# Patient Record
Sex: FEMALE | Race: WHITE | NOT HISPANIC OR LATINO | Employment: FULL TIME | URBAN - METROPOLITAN AREA
[De-identification: names, ages, dates, MRNs, and addresses within clinical notes are randomized per-mention and may not be internally consistent; named-entity substitution may affect disease eponyms.]

---

## 2017-02-09 ENCOUNTER — OFFICE VISIT (OUTPATIENT)
Dept: RETAIL CLINIC | Facility: CLINIC | Age: 42
End: 2017-02-09

## 2017-02-09 VITALS
HEIGHT: 66 IN | OXYGEN SATURATION: 98 % | TEMPERATURE: 99.4 F | BODY MASS INDEX: 22.5 KG/M2 | WEIGHT: 140 LBS | HEART RATE: 85 BPM | DIASTOLIC BLOOD PRESSURE: 80 MMHG | SYSTOLIC BLOOD PRESSURE: 116 MMHG

## 2017-02-09 DIAGNOSIS — R68.89 FLU-LIKE SYMPTOMS: Primary | ICD-10-CM

## 2017-02-09 LAB
EXPIRATION DATE: NORMAL
FLUAV AG NPH QL: NORMAL
FLUBV AG NPH QL: NORMAL
INTERNAL CONTROL: NORMAL
Lab: NORMAL

## 2017-02-09 PROCEDURE — 99213 OFFICE O/P EST LOW 20 MIN: CPT | Performed by: NURSE PRACTITIONER

## 2017-02-09 PROCEDURE — 87804 INFLUENZA ASSAY W/OPTIC: CPT | Performed by: NURSE PRACTITIONER

## 2017-02-09 RX ORDER — OSELTAMIVIR PHOSPHATE 75 MG/1
75 CAPSULE ORAL 2 TIMES DAILY
Qty: 10 CAPSULE | Refills: 0 | Status: SHIPPED | OUTPATIENT
Start: 2017-02-09 | End: 2017-02-20

## 2017-02-09 NOTE — PROGRESS NOTES
Subjective   Jeane Escalante is a 41 y.o. female.     History of Present Illness   Pt. Has been in the bed 2 days because she feels so bad. She has body aches and chills with sweats, headache, and cough. She hs used IBU for headache pain. She is drinking  A lot of fluids and has had mild nausea. She has had no known exposure to illness or flu and she did have the flu shot.  The following portions of the patient's history were reviewed and updated as appropriate: allergies, current medications, past family history, past social history, past surgical history and problem list.    Review of Systems   Constitutional: Positive for activity change, appetite change, chills, fatigue and fever.   HENT: Positive for congestion and rhinorrhea. Negative for ear pain.    Eyes: Negative.    Respiratory: Positive for cough. Negative for shortness of breath and wheezing.    Cardiovascular: Negative.    Endocrine: Negative.    Musculoskeletal: Positive for myalgias.   Skin: Negative.    Allergic/Immunologic: Negative.    Neurological: Positive for headaches.       Objective   Physical Exam   Constitutional: She is oriented to person, place, and time. She appears well-developed and well-nourished.   HENT:   Head: Normocephalic and atraumatic.   Nose: Nose normal.   Mouth/Throat: Oropharynx is clear and moist.   Cardiovascular: Normal rate, regular rhythm and normal heart sounds.  Exam reveals no friction rub.    No murmur heard.  Pulmonary/Chest: Effort normal and breath sounds normal. No respiratory distress. She has no wheezes. She has no rales. She exhibits no tenderness.   Lymphadenopathy:     She has no cervical adenopathy.   Neurological: She is alert and oriented to person, place, and time.   Skin: Skin is warm and dry.   Psychiatric: She has a normal mood and affect. Her behavior is normal. Judgment and thought content normal.   Nursing note and vitals reviewed.      Assessment/Plan   Jeane was seen today for cough, headache,  chills and hot flashes.    Diagnoses and all orders for this visit:    Flu-like symptoms  -     POC Influenza A / B  -     oseltamivir (TAMIFLU) 75 MG capsule; Take 1 capsule by mouth 2 (Two) Times a Day.        CHARLES Wilcox

## 2017-02-09 NOTE — PATIENT INSTRUCTIONS
Influenza, Adult  Influenza (flu) is an infection in the mouth, nose, and throat (respiratory tract) caused by a virus. The flu can make you feel very ill. Influenza spreads easily from person to person (contagious).   HOME CARE   · Only take medicines as told by your doctor.  · Use a cool mist humidifier to make breathing easier.  · Get plenty of rest until your fever goes away. This usually takes 3 to 4 days.  · Drink enough fluids to keep your pee (urine) clear or pale yellow.  · Cover your mouth and nose when you cough or sneeze.  · Wash your hands well to avoid spreading the flu.  · Stay home from work or school until your fever has been gone for at least 1 full day.  · Get a flu shot every year.  GET HELP RIGHT AWAY IF:   · You have trouble breathing or feel short of breath.  · Your skin or nails turn blue.  · You have severe neck pain or stiffness.  · You have a severe headache, facial pain, or earache.  · Your fever gets worse or keeps coming back.  · You feel sick to your stomach (nauseous), throw up (vomit), or have watery poop (diarrhea).  · You have chest pain.  · You have a deep cough that gets worse, or you cough up more thick spit (mucus).  MAKE SURE YOU:   · Understand these instructions.  · Will watch your condition.  · Will get help right away if you are not doing well or get worse.     This information is not intended to replace advice given to you by your health care provider. Make sure you discuss any questions you have with your health care provider.     Document Released: 09/26/2009 Document Revised: 01/08/2016 Document Reviewed: 03/18/2013  [employer] Interactive Patient Education ©2016 [employer]

## 2017-02-20 ENCOUNTER — OFFICE VISIT (OUTPATIENT)
Dept: RETAIL CLINIC | Facility: CLINIC | Age: 42
End: 2017-02-20

## 2017-02-20 VITALS
WEIGHT: 140 LBS | HEIGHT: 66 IN | HEART RATE: 78 BPM | OXYGEN SATURATION: 97 % | TEMPERATURE: 98.1 F | BODY MASS INDEX: 22.5 KG/M2

## 2017-02-20 DIAGNOSIS — J40 BRONCHITIS: Primary | ICD-10-CM

## 2017-02-20 DIAGNOSIS — Z72.0 TOBACCO ABUSE: ICD-10-CM

## 2017-02-20 PROCEDURE — 99213 OFFICE O/P EST LOW 20 MIN: CPT | Performed by: NURSE PRACTITIONER

## 2017-02-20 RX ORDER — PREDNISONE 10 MG/1
TABLET ORAL DAILY
Qty: 21 EACH | Refills: 0 | Status: SHIPPED | OUTPATIENT
Start: 2017-02-20 | End: 2017-02-26

## 2017-02-20 RX ORDER — CEFDINIR 300 MG/1
300 CAPSULE ORAL 2 TIMES DAILY
Qty: 20 CAPSULE | Refills: 0 | Status: SHIPPED | OUTPATIENT
Start: 2017-02-20 | End: 2017-03-02

## 2017-02-20 RX ORDER — DEXTROMETHORPHAN HYDROBROMIDE AND PROMETHAZINE HYDROCHLORIDE 15; 6.25 MG/5ML; MG/5ML
5 SYRUP ORAL 2 TIMES DAILY
Qty: 70 ML | Refills: 0 | Status: SHIPPED | OUTPATIENT
Start: 2017-02-20 | End: 2017-02-27

## 2017-05-30 ENCOUNTER — OFFICE VISIT (OUTPATIENT)
Dept: RETAIL CLINIC | Facility: CLINIC | Age: 42
End: 2017-05-30

## 2017-05-30 DIAGNOSIS — Z02.1 PRE-EMPLOYMENT DRUG SCREENING: Primary | ICD-10-CM

## 2018-05-01 ENCOUNTER — OFFICE VISIT (OUTPATIENT)
Dept: RETAIL CLINIC | Facility: CLINIC | Age: 43
End: 2018-05-01

## 2018-05-01 VITALS — OXYGEN SATURATION: 96 % | HEART RATE: 72 BPM | TEMPERATURE: 98.8 F

## 2018-05-01 DIAGNOSIS — J06.9 UPPER RESPIRATORY TRACT INFECTION, UNSPECIFIED TYPE: Primary | ICD-10-CM

## 2018-05-01 DIAGNOSIS — J20.9 ACUTE BRONCHITIS, UNSPECIFIED ORGANISM: ICD-10-CM

## 2018-05-01 PROCEDURE — 99213 OFFICE O/P EST LOW 20 MIN: CPT | Performed by: NURSE PRACTITIONER

## 2018-05-01 RX ORDER — DEXTROMETHORPHAN HYDROBROMIDE AND PROMETHAZINE HYDROCHLORIDE 15; 6.25 MG/5ML; MG/5ML
5 SYRUP ORAL 4 TIMES DAILY PRN
Qty: 1 ML | Refills: 0 | Status: SHIPPED | OUTPATIENT
Start: 2018-05-01 | End: 2018-07-09

## 2018-05-01 RX ORDER — AZITHROMYCIN 250 MG/1
TABLET, FILM COATED ORAL
Qty: 6 TABLET | Refills: 0 | Status: SHIPPED | OUTPATIENT
Start: 2018-05-01 | End: 2018-05-02

## 2018-05-01 RX ORDER — PREDNISONE 10 MG/1
TABLET ORAL DAILY
Qty: 21 EACH | Refills: 0 | Status: SHIPPED | OUTPATIENT
Start: 2018-05-01 | End: 2018-05-07

## 2018-05-01 RX ORDER — ALBUTEROL SULFATE 90 UG/1
2 AEROSOL, METERED RESPIRATORY (INHALATION) EVERY 4 HOURS PRN
Qty: 1 INHALER | Refills: 0 | Status: SHIPPED | OUTPATIENT
Start: 2018-05-01 | End: 2018-07-09

## 2018-05-01 RX ORDER — FLUTICASONE PROPIONATE 50 MCG
2 SPRAY, SUSPENSION (ML) NASAL DAILY
Qty: 1 BOTTLE | Refills: 0 | Status: SHIPPED | OUTPATIENT
Start: 2018-05-01 | End: 2018-07-09

## 2018-05-01 NOTE — PATIENT INSTRUCTIONS
Acute Bronchitis, Adult  Acute bronchitis is when air tubes (bronchi) in the lungs suddenly get swollen. The condition can make it hard to breathe. It can also cause these symptoms:  · A cough.  · Coughing up clear, yellow, or green mucus.  · Wheezing.  · Chest congestion.  · Shortness of breath.  · A fever.  · Body aches.  · Chills.  · A sore throat.  Follow these instructions at home:  Medicines   · Take over-the-counter and prescription medicines only as told by your doctor.  · If you were prescribed an antibiotic medicine, take it as told by your doctor. Do not stop taking the antibiotic even if you start to feel better.  General instructions   · Rest.  · Drink enough fluids to keep your pee (urine) clear or pale yellow.  · Avoid smoking and secondhand smoke. If you smoke and you need help quitting, ask your doctor. Quitting will help your lungs heal faster.  · Use an inhaler, cool mist vaporizer, or humidifier as told by your doctor.  · Keep all follow-up visits as told by your doctor. This is important.  How is this prevented?  To lower your risk of getting this condition again:  · Wash your hands often with soap and water. If you cannot use soap and water, use hand .  · Avoid contact with people who have cold symptoms.  · Try not to touch your hands to your mouth, nose, or eyes.  · Make sure to get the flu shot every year.  Contact a doctor if:  · Your symptoms do not get better in 2 weeks.  Get help right away if:  · You cough up blood.  · You have chest pain.  · You have very bad shortness of breath.  · You become dehydrated.  · You faint (pass out) or keep feeling like you are going to pass out.  · You keep throwing up (vomiting).  · You have a very bad headache.  · Your fever or chills gets worse.  This information is not intended to replace advice given to you by your health care provider. Make sure you discuss any questions you have with your health care provider.  Document Released: 06/05/2009  Document Revised: 07/26/2017 Document Reviewed: 06/07/2017  [employer] Interactive Patient Education © 2017 [employer]

## 2018-05-01 NOTE — PROGRESS NOTES
Martha Escalante is a 43 y.o. female.     History of Present Illness   Patient presents with cough, nasal congestion, headache and chills that began 4 days ago. She is getting worse rather than better and has been tired and lethargic. She is taking Mucinex for cough and IBU for headache pain. She has a tactile fever and chills.  The following portions of the patient's history were reviewed and updated as appropriate: allergies, current medications, past family history, past social history, past surgical history and problem list.    Review of Systems   Constitutional: Positive for activity change, chills, fatigue and fever.   HENT: Positive for congestion, nosebleeds, postnasal drip and rhinorrhea. Negative for sore throat, trouble swallowing and voice change.    Eyes: Negative.    Respiratory: Positive for cough. Negative for shortness of breath and wheezing.    Cardiovascular: Negative.    Musculoskeletal: Negative.    Skin: Negative.    Allergic/Immunologic: Negative.    Neurological: Negative.    Hematological: Negative.        Objective   Physical Exam   Constitutional: She is oriented to person, place, and time. Vital signs are normal. She appears well-developed and well-nourished. She is cooperative.   HENT:   Head: Normocephalic and atraumatic.   Right Ear: Hearing, tympanic membrane, external ear and ear canal normal.   Left Ear: Hearing, tympanic membrane, external ear and ear canal normal.   Nose: Mucosal edema and rhinorrhea present. Right sinus exhibits no maxillary sinus tenderness and no frontal sinus tenderness. Left sinus exhibits no maxillary sinus tenderness and no frontal sinus tenderness.   Mouth/Throat: Mucous membranes are normal. No oropharyngeal exudate, posterior oropharyngeal edema, posterior oropharyngeal erythema or tonsillar abscesses. Tonsils are 0 on the right. Tonsils are 0 on the left. No tonsillar exudate.   Eyes: Conjunctivae and EOM are normal. Pupils are equal, round, and  reactive to light.   Neck: Normal range of motion. Neck supple.   Cardiovascular: Normal rate, regular rhythm, normal heart sounds and intact distal pulses.  Exam reveals no friction rub.    No murmur heard.  Pulmonary/Chest: Effort normal and breath sounds normal. No respiratory distress. She has no wheezes. She has no rales.   Lymphadenopathy:     She has no cervical adenopathy.   Neurological: She is alert and oriented to person, place, and time.   Skin: Skin is warm. No rash noted. No erythema.   Psychiatric: She has a normal mood and affect. Her behavior is normal. Judgment and thought content normal.   Nursing note and vitals reviewed.      Assessment/Plan   Jeane was seen today for nasal congestion, cough, headache and chills.    Diagnoses and all orders for this visit:    Upper respiratory tract infection, unspecified type  -     azithromycin (ZITHROMAX Z-OMI) 250 MG tablet; Take 2 tablets the first day, then 1 tablet daily for 4 days.  -     albuterol (PROVENTIL HFA;VENTOLIN HFA) 108 (90 Base) MCG/ACT inhaler; Inhale 2 puffs Every 4 (Four) Hours As Needed for Wheezing or Shortness of Air.    Other orders  -     PredniSONE (DELTASONE) 10 MG (21) tablet pack; Take  by mouth Daily for 6 days.  -     promethazine-dextromethorphan (PROMETHAZINE-DM) 6.25-15 MG/5ML syrup; Take 5 mL by mouth 4 (Four) Times a Day As Needed for Cough.  -     fluticasone (FLONASE) 50 MCG/ACT nasal spray; 2 sprays into each nostril Daily.

## 2018-05-02 RX ORDER — AMOXICILLIN AND CLAVULANATE POTASSIUM 500; 125 MG/1; MG/1
1 TABLET, FILM COATED ORAL 2 TIMES DAILY
Qty: 20 TABLET | Refills: 0 | Status: SHIPPED | OUTPATIENT
Start: 2018-05-02 | End: 2018-05-12

## 2018-05-02 NOTE — PROGRESS NOTES
Patient arrived to clinic, states she has felt more fatigued and dizzy since starting medications last night.  Has not started taking steroids, yet.  Took azithromycin and promethazine DM.  Advised to stop taking zpak and promethazine DM.  Work excuse given for today.  Antibiotic order changed to augmentin 500/125.  Patient advised to seek higher level of care if symptoms continue to worsen.

## 2018-07-09 ENCOUNTER — OFFICE VISIT (OUTPATIENT)
Dept: RETAIL CLINIC | Facility: CLINIC | Age: 43
End: 2018-07-09

## 2018-07-09 VITALS — TEMPERATURE: 98.2 F | HEIGHT: 66 IN | HEART RATE: 77 BPM | BODY MASS INDEX: 23.5 KG/M2 | WEIGHT: 146.2 LBS

## 2018-07-09 DIAGNOSIS — J06.9 UPPER RESPIRATORY TRACT INFECTION, UNSPECIFIED TYPE: ICD-10-CM

## 2018-07-09 DIAGNOSIS — K02.9 DENTAL CARIES: Primary | ICD-10-CM

## 2018-07-09 PROCEDURE — 99213 OFFICE O/P EST LOW 20 MIN: CPT | Performed by: NURSE PRACTITIONER

## 2018-07-09 RX ORDER — FLUTICASONE PROPIONATE 50 MCG
2 SPRAY, SUSPENSION (ML) NASAL DAILY
Qty: 1 BOTTLE | Refills: 0 | OUTPATIENT
Start: 2018-07-09 | End: 2019-02-26

## 2018-07-09 RX ORDER — AMOXICILLIN 875 MG/1
875 TABLET, COATED ORAL 2 TIMES DAILY
Qty: 20 TABLET | Refills: 0 | OUTPATIENT
Start: 2018-07-09 | End: 2019-02-26

## 2018-07-09 NOTE — PROGRESS NOTES
Subjective   Jeane Escalante is a 43 y.o. female.     History of Present Illness   Patient presents with nasal congestion, frontal  headache pain and chills. She began with a sore throat but it is resolved now. Currently her nasal discharge is yellow green and she has pressure around her eyes. She has some tooth decay in the back molar areas and she wonders if it has something to do with her teeth.   The following portions of the patient's history were reviewed and updated as appropriate: allergies, current medications, past family history, past medical history, past surgical history and problem list.    Review of Systems   Constitutional: Positive for activity change, chills and fatigue.   HENT: Positive for congestion (nasaldental caries in molar area bilaterally), dental problem, rhinorrhea, sinus pain and sinus pressure. Negative for ear discharge, ear pain, facial swelling, sore throat (at first but not now) and trouble swallowing.    Eyes: Negative.    Respiratory: Positive for cough. Negative for shortness of breath, wheezing and stridor.         Productive cough with green expectorant   Cardiovascular: Negative.    Gastrointestinal: Negative.    Musculoskeletal: Negative.    Skin: Negative.    Allergic/Immunologic: Positive for environmental allergies.   Neurological: Positive for headaches.   Hematological: Negative.    Psychiatric/Behavioral: Negative.        Objective   Physical Exam   Constitutional: She is oriented to person, place, and time. She appears well-developed and well-nourished.   HENT:   Head: Normocephalic and atraumatic.   Right Ear: External ear normal.   Left Ear: External ear normal.   Eyes: Conjunctivae and EOM are normal. Pupils are equal, round, and reactive to light.   Neck: Normal range of motion. Neck supple.   Cardiovascular: Normal rate, regular rhythm and normal heart sounds.  Exam reveals no friction rub.    No murmur heard.  Pulmonary/Chest: Effort normal and breath sounds normal.  No stridor. No respiratory distress. She has no wheezes. She has no rales.   Musculoskeletal: Normal range of motion.   Neurological: She is alert and oriented to person, place, and time.   Skin: Skin is warm and dry. Capillary refill takes less than 2 seconds.   Psychiatric: She has a normal mood and affect. Her behavior is normal. Thought content normal.   Nursing note and vitals reviewed.      Assessment/Plan   Jeane was seen today for nasal congestion, headache and chills.    Diagnoses and all orders for this visit:    Dental caries  -     amoxicillin (AMOXIL) 875 MG tablet; Take 1 tablet by mouth 2 (Two) Times a Day.    Upper respiratory tract infection, unspecified type  -     fluticasone (FLONASE) 50 MCG/ACT nasal spray; 2 sprays into each nostril Daily.    increase fluids and rest  Follow up with PCP prn worsening s/s.    CHARLES Wilcox

## 2018-07-09 NOTE — PATIENT INSTRUCTIONS
"Upper Respiratory Infection, Adult  Most upper respiratory infections (URIs) are a viral infection of the air passages leading to the lungs. A URI affects the nose, throat, and upper air passages. The most common type of URI is nasopharyngitis and is typically referred to as \"the common cold.\"  URIs run their course and usually go away on their own. Most of the time, a URI does not require medical attention, but sometimes a bacterial infection in the upper airways can follow a viral infection. This is called a secondary infection. Sinus and middle ear infections are common types of secondary upper respiratory infections.  Bacterial pneumonia can also complicate a URI. A URI can worsen asthma and chronic obstructive pulmonary disease (COPD). Sometimes, these complications can require emergency medical care and may be life threatening.  What are the causes?  Almost all URIs are caused by viruses. A virus is a type of germ and can spread from one person to another.  What increases the risk?  You may be at risk for a URI if:  · You smoke.  · You have chronic heart or lung disease.  · You have a weakened defense (immune) system.  · You are very young or very old.  · You have nasal allergies or asthma.  · You work in crowded or poorly ventilated areas.  · You work in health care facilities or schools.    What are the signs or symptoms?  Symptoms typically develop 2-3 days after you come in contact with a cold virus. Most viral URIs last 7-10 days. However, viral URIs from the influenza virus (flu virus) can last 14-18 days and are typically more severe. Symptoms may include:  · Runny or stuffy (congested) nose.  · Sneezing.  · Cough.  · Sore throat.  · Headache.  · Fatigue.  · Fever.  · Loss of appetite.  · Pain in your forehead, behind your eyes, and over your cheekbones (sinus pain).  · Muscle aches.    How is this diagnosed?  Your health care provider may diagnose a URI by:  · Physical exam.  · Tests to check that your " symptoms are not due to another condition such as:  ? Strep throat.  ? Sinusitis.  ? Pneumonia.  ? Asthma.    How is this treated?  A URI goes away on its own with time. It cannot be cured with medicines, but medicines may be prescribed or recommended to relieve symptoms. Medicines may help:  · Reduce your fever.  · Reduce your cough.  · Relieve nasal congestion.    Follow these instructions at home:  · Take medicines only as directed by your health care provider.  · Gargle warm saltwater or take cough drops to comfort your throat as directed by your health care provider.  · Use a warm mist humidifier or inhale steam from a shower to increase air moisture. This may make it easier to breathe.  · Drink enough fluid to keep your urine clear or pale yellow.  · Eat soups and other clear broths and maintain good nutrition.  · Rest as needed.  · Return to work when your temperature has returned to normal or as your health care provider advises. You may need to stay home longer to avoid infecting others. You can also use a face mask and careful hand washing to prevent spread of the virus.  · Increase the usage of your inhaler if you have asthma.  · Do not use any tobacco products, including cigarettes, chewing tobacco, or electronic cigarettes. If you need help quitting, ask your health care provider.  How is this prevented?  The best way to protect yourself from getting a cold is to practice good hygiene.  · Avoid oral or hand contact with people with cold symptoms.  · Wash your hands often if contact occurs.    There is no clear evidence that vitamin C, vitamin E, echinacea, or exercise reduces the chance of developing a cold. However, it is always recommended to get plenty of rest, exercise, and practice good nutrition.  Contact a health care provider if:  · You are getting worse rather than better.  · Your symptoms are not controlled by medicine.  · You have chills.  · You have worsening shortness of breath.  · You have  brown or red mucus.  · You have yellow or brown nasal discharge.  · You have pain in your face, especially when you bend forward.  · You have a fever.  · You have swollen neck glands.  · You have pain while swallowing.  · You have white areas in the back of your throat.  Get help right away if:  · You have severe or persistent:  ? Headache.  ? Ear pain.  ? Sinus pain.  ? Chest pain.  · You have chronic lung disease and any of the following:  ? Wheezing.  ? Prolonged cough.  ? Coughing up blood.  ? A change in your usual mucus.  · You have a stiff neck.  · You have changes in your:  ? Vision.  ? Hearing.  ? Thinking.  ? Mood.  This information is not intended to replace advice given to you by your health care provider. Make sure you discuss any questions you have with your health care provider.  Document Released: 06/13/2002 Document Revised: 08/20/2017 Document Reviewed: 03/25/2015  [employer] Interactive Patient Education © 2018 [employer]  Dental Caries  Dental caries are spots of decay (cavities) in teeth. They are in the outer layer of your tooth (enamel). Treat them as soon as you can. If they are not treated, they can spread decay and lead to painful infection.  Follow these instructions at home:  General instructions  · Take good care of your mouth and teeth. This keeps them healthy.  ? Brush your teeth 2 times a day. Use toothpaste with fluoride in it.  ? Floss your teeth once a day.  · If your dentist prescribed an antibiotic medicine to treat an infection, take it as told. Do not stop taking the antibiotic even if your condition gets better.  · Keep all follow-up visits as told by your dentist. This is important. This includes all cleanings.  Preventing dental caries  · Brush your teeth every morning and night. Use fluoride toothpaste.  · Get regular dental cleanings.  · If you are at risk of dental caries.  ? Wash your mouth with prescription mouthwash (chlorhexidine).  ? Put topical fluoride on your  teeth.  · Drink water with fluoride in it.  · Drink water instead of sugary drinks.  · Eat healthy meals and snacks.  Contact a doctor if:  · You have symptoms of tooth decay.  Summary  · Dental caries are spots of decay (cavities) in teeth. They are in the outer layer of your tooth.  · Take an antibiotic to treat an infection, if told by your dentist. Do not stop taking the antibiotic even if your condition gets better.  · Regular dental cleanings and brushing can help prevent dental caries.  This information is not intended to replace advice given to you by your health care provider. Make sure you discuss any questions you have with your health care provider.  Document Released: 09/26/2009 Document Revised: 09/03/2017 Document Reviewed: 09/03/2017  [employer] Interactive Patient Education © 2017 [employer]

## 2019-02-26 PROCEDURE — 86481 TB AG RESPONSE T-CELL SUSP: CPT

## 2019-02-28 ENCOUNTER — LAB REQUISITION (OUTPATIENT)
Dept: LAB | Facility: HOSPITAL | Age: 44
End: 2019-02-28

## 2019-02-28 DIAGNOSIS — Z00.00 ROUTINE GENERAL MEDICAL EXAMINATION AT A HEALTH CARE FACILITY: ICD-10-CM

## 2019-03-01 LAB
TSPOT INTERPRETATION: NEGATIVE
TSPOT NIL CONTROL INTERPRETATION: NORMAL
TSPOT PANEL A: 0
TSPOT PANEL B: 0
TSPOT POS CONTROL INTERPRETATION: NORMAL

## 2019-03-08 ENCOUNTER — TELEPHONE (OUTPATIENT)
Dept: URGENT CARE | Facility: CLINIC | Age: 44
End: 2019-03-08

## 2019-06-05 ENCOUNTER — OFFICE VISIT (OUTPATIENT)
Dept: RETAIL CLINIC | Facility: CLINIC | Age: 44
End: 2019-06-05

## 2019-06-05 VITALS
SYSTOLIC BLOOD PRESSURE: 90 MMHG | BODY MASS INDEX: 21.48 KG/M2 | OXYGEN SATURATION: 97 % | TEMPERATURE: 98.3 F | HEART RATE: 87 BPM | HEIGHT: 69 IN | DIASTOLIC BLOOD PRESSURE: 60 MMHG | WEIGHT: 145 LBS

## 2019-06-05 DIAGNOSIS — K52.9 GASTROENTERITIS: Primary | ICD-10-CM

## 2019-06-05 DIAGNOSIS — Z23 NEED FOR PNEUMOCOCCAL VACCINE: ICD-10-CM

## 2019-06-05 PROCEDURE — 99213 OFFICE O/P EST LOW 20 MIN: CPT | Performed by: NURSE PRACTITIONER

## 2019-06-05 NOTE — PROGRESS NOTES
"TORI Escalante is a 44 y.o. female.   Chief Complaint   Patient presents with   • Vomiting   • Nausea      History of Present Illness   2 day h/o nausea and vomiting that has caused her to miss work. She had abdominal cramping which has resolved now but she denies ever or diarrhea. . She is able to eat and drink now but needs a work excuse for yesterday and today.   The following portions of the patient's history were reviewed and updated as appropriate: allergies, current medications, past family history, past medical history, past social history, past surgical history and problem list.  No current outpatient medications on file.    Allergies   Allergen Reactions   • Zithromax [Azithromycin] Dizziness   • Sudafed [Pseudoephedrine] Rash   • Sulfa Antibiotics Rash       Review of Systems   Constitutional: Positive for activity change, appetite change and fatigue. Negative for chills and fever.   HENT: Negative.  Negative for congestion, ear discharge and ear pain.    Eyes: Negative.    Respiratory: Negative.    Cardiovascular: Negative.    Gastrointestinal: Positive for abdominal pain (jamin umbillical), nausea and vomiting. Negative for abdominal distention, blood in stool, constipation, diarrhea and rectal pain.   Genitourinary: Negative.    Musculoskeletal: Negative.    Skin: Negative.    Allergic/Immunologic: Negative.    Neurological: Negative.    Hematological: Negative.        Objective     Visit Vitals  BP 90/60   Pulse 87   Temp 98.3 °F (36.8 °C)   Ht 175.3 cm (69\")   Wt 65.8 kg (145 lb)   SpO2 97%   BMI 21.41 kg/m²         Physical Exam   Constitutional: She is oriented to person, place, and time. Vital signs are normal. She appears well-developed and well-nourished. She is cooperative.  Non-toxic appearance. She does not have a sickly appearance. She does not appear ill. No distress.   HENT:   Head: Normocephalic and atraumatic.   Mouth/Throat: Oropharynx is clear and moist and mucous " membranes are normal. Tonsils are 0 on the right. Tonsils are 0 on the left. No tonsillar exudate.   Neck: Normal range of motion. Neck supple.   Cardiovascular: Normal rate, regular rhythm and normal heart sounds.   No murmur heard.  Pulmonary/Chest: Effort normal and breath sounds normal. No respiratory distress.   Abdominal: Soft. Bowel sounds are normal. She exhibits no distension and no mass. There is no tenderness. There is no rebound and no guarding. No hernia.   Neurological: She is alert and oriented to person, place, and time.   Skin: Skin is warm and dry.   Nursing note and vitals reviewed.      Lab Results (last 24 hours)     ** No results found for the last 24 hours. **          Assessment/Plan   Jeane was seen today for vomiting and nausea.    Diagnoses and all orders for this visit:    Gastroenteritis     clear liquids no mild or dairy  Advance diet as tolerated.     Traci Michael, CHARLES

## 2019-06-05 NOTE — PATIENT INSTRUCTIONS
Viral Gastroenteritis, Adult  Viral gastroenteritis is also known as the stomach flu. This condition is caused by certain germs (viruses). These germs can be passed from person to person very easily (are very contagious). This condition can cause sudden watery poop (diarrhea), fever, and throwing up (vomiting).  Having watery poop and throwing up can make you feel weak and cause you to get dehydrated. Dehydration can make you tired and thirsty, make you have a dry mouth, and make it so you pee (urinate) less often. Older adults and people with other diseases or a weak defense system (immune system) are at higher risk for dehydration. It is important to replace the fluids that you lose from having watery poop and throwing up.  Follow these instructions at home:  Follow instructions from your doctor about how to care for yourself at home.  Eating and drinking  Follow these instructions as told by your doctor:  · Take an oral rehydration solution (ORS). This is a drink that is sold at pharmacies and stores.  · Drink clear fluids in small amounts as you are able, such as:  ? Water.  ? Ice chips.  ? Diluted fruit juice.  ? Low-calorie sports drinks.  · Eat bland, easy-to-digest foods in small amounts as you are able, such as:  ? Bananas.  ? Applesauce.  ? Rice.  ? Low-fat (lean) meats.  ? Toast.  ? Crackers.  · Avoid fluids that have a lot of sugar or caffeine in them.  · Avoid alcohol.  · Avoid spicy or fatty foods.    General instructions  · Drink enough fluid to keep your pee (urine) clear or pale yellow.  · Wash your hands often. If you cannot use soap and water, use hand .  · Make sure that all people in your home wash their hands well and often.  · Rest at home while you get better.  · Take over-the-counter and prescription medicines only as told by your doctor.  · Watch your condition for any changes.  · Take a warm bath to help with any burning or pain from having watery poop.  · Keep all follow-up  visits as told by your doctor. This is important.  Contact a doctor if:  · You cannot keep fluids down.  · Your symptoms get worse.  · You have new symptoms.  · You feel light-headed or dizzy.  · You have muscle cramps.  Get help right away if:  · You have chest pain.  · You feel very weak or you pass out (faint).  · You see blood in your throw-up.  · Your throw-up looks like coffee grounds.  · You have bloody or black poop (stools) or poop that look like tar.  · You have a very bad headache, a stiff neck, or both.  · You have a rash.  · You have very bad pain, cramping, or bloating in your belly (abdomen).  · You have trouble breathing.  · You are breathing very quickly.  · Your heart is beating very quickly.  · Your skin feels cold and clammy.  · You feel confused.  · You have pain when you pee.  · You have signs of dehydration, such as:  ? Dark pee, hardly any pee, or no pee.  ? Cracked lips.  ? Dry mouth.  ? Sunken eyes.  ? Sleepiness.  ? Weakness.  This information is not intended to replace advice given to you by your health care provider. Make sure you discuss any questions you have with your health care provider.  Document Released: 06/05/2009 Document Revised: 02/16/2018 Document Reviewed: 08/23/2016  [employer] Patient Education © 2019 [employer]

## 2019-07-08 ENCOUNTER — TELEPHONE (OUTPATIENT)
Dept: OBSTETRICS AND GYNECOLOGY | Facility: CLINIC | Age: 44
End: 2019-07-08

## 2019-07-08 NOTE — TELEPHONE ENCOUNTER
[facility] pt called stating she had a mirena inserted 2 years ago and is now c/o bleeding during and after intercourse. Wants to know what she needs to do. Please contact back-may leave message if unable to answer she is at work

## 2019-07-08 NOTE — TELEPHONE ENCOUNTER
Spoke with patient and advised her that Dr. Lombardo would like to see her in the office this week.. I got the patient an appointment scheduled for Wednesday 7/10/19 at 3:30.  Patient verbalized understanding and had no further questions at this time.

## 2019-07-10 ENCOUNTER — OFFICE VISIT (OUTPATIENT)
Dept: OBSTETRICS AND GYNECOLOGY | Facility: CLINIC | Age: 44
End: 2019-07-10

## 2019-07-10 VITALS — WEIGHT: 154.2 LBS | BODY MASS INDEX: 22.77 KG/M2 | DIASTOLIC BLOOD PRESSURE: 70 MMHG | SYSTOLIC BLOOD PRESSURE: 110 MMHG

## 2019-07-10 DIAGNOSIS — Z12.31 SCREENING MAMMOGRAM, ENCOUNTER FOR: ICD-10-CM

## 2019-07-10 DIAGNOSIS — N93.9 ABNORMAL UTERINE BLEEDING (AUB): Primary | ICD-10-CM

## 2019-07-10 DIAGNOSIS — Z30.431 IUD CHECK UP: ICD-10-CM

## 2019-07-10 DIAGNOSIS — Z01.419 WOMEN'S ANNUAL ROUTINE GYNECOLOGICAL EXAMINATION: ICD-10-CM

## 2019-07-10 PROCEDURE — 99396 PREV VISIT EST AGE 40-64: CPT | Performed by: OBSTETRICS & GYNECOLOGY

## 2019-07-10 NOTE — PROGRESS NOTES
Subjective   Jeane Escalante is a 44 y.o. female is here today as a self referral.    Chief Complaint   Patient presents with   • Vaginal Bleeding     c/o abnormal bleeding   • Annual Exam        History of Present Illness  Patient has been using a Mirena IUD for birth control for several years may be 3 years.  She is having no periods.  However in May of this year she started having bleeding after intercourse.  The bleeding is been having and the patient worries that there is something wrong.  She is having no other GYN problems.  The following portions of the patient's history were reviewed and updated as appropriate: allergies, current medications, past family history, past medical history, past social history, past surgical history and problem list.    Review of Systems - History obtained from the patient  General ROS: positive for  - weight gain  Psychological ROS: positive for - anxiety and depression  ENT ROS: negative  Allergy and Immunology ROS: negative  Hematological and Lymphatic ROS: negative  Endocrine ROS: negative  Breast ROS: negative for breast lumps  Patient needs to schedule a mammogram  Respiratory ROS: positive for - wheezing and she has a history of smoking  Cardiovascular ROS: no chest pain or dyspnea on exertion  Gastrointestinal ROS: no abdominal pain, change in bowel habits, or black or bloody stools  Genito-Urinary ROS: no dysuria, trouble voiding, or hematuria  Musculoskeletal ROS: positive for - pain in knee - bilateral  Neurological ROS: no TIA or stroke symptoms  Dermatological ROS: negative     Objective   General:  well developed; well nourished  no acute distress   Skin:  No suspicious lesions seen   Thyroid: normal to inspection and palpation   Breasts:  Not performed.   Abdomen: soft, non-tender; no masses  no umbilical or inguinal hernias are present  no hepato-splenomegaly   Heart: regular rate and rhythm, S1, S2 normal, no murmur, click, rub or gallop   Lungs: clear to  auscultation   Pelvis: Clinical staff was present for exam  External genitalia:  normal appearance of the external genitalia including Bartholin's and [place]'s glands.  :  urethral meatus normal;  Vaginal:  normal pink mucosa without prolapse or lesions.  Cervix:  normal appearance. IUD string present - 1.5 cms in length; Pap smear was done  Uterus:  normal size, shape and consistency. retroverted;  Adnexa:  normal bimanual exam of the adnexa.  Rectal:  digital rectal exam not performed; anus visually normal appearing.         Assessment/Plan   Jeane was seen today for vaginal bleeding and annual exam.    Diagnoses and all orders for this visit:    Abnormal uterine bleeding (AUB)    IUD check up  -     US Non-ob Transvaginal; Future    Women's annual routine gynecological examination  -     Liquid-based Pap Smear, Screening    Screening mammogram, encounter for  -     Mammo Screening Digital Tomosynthesis Bilateral With CAD; Future      Return on 8/15/2019 for vaginal ultrasound to check IUD    Christophe Lombardo MD

## 2019-07-15 ENCOUNTER — OFFICE VISIT (OUTPATIENT)
Dept: OBSTETRICS AND GYNECOLOGY | Facility: CLINIC | Age: 44
End: 2019-07-15

## 2019-07-15 VITALS — BODY MASS INDEX: 22.15 KG/M2 | WEIGHT: 150 LBS | DIASTOLIC BLOOD PRESSURE: 74 MMHG | SYSTOLIC BLOOD PRESSURE: 100 MMHG

## 2019-07-15 DIAGNOSIS — N39.3 STRESS INCONTINENCE IN FEMALE: ICD-10-CM

## 2019-07-15 DIAGNOSIS — N93.9 ABNORMAL UTERINE BLEEDING (AUB): Primary | ICD-10-CM

## 2019-07-15 DIAGNOSIS — K62.5 RECTAL BLEEDING: ICD-10-CM

## 2019-07-15 DIAGNOSIS — Z30.431 IUD CHECK UP: ICD-10-CM

## 2019-07-15 PROCEDURE — 99212-NC PR NO CHARGE CBC OFFICE OUTPATIENT VISIT 10 MINUTES: Performed by: OBSTETRICS & GYNECOLOGY

## 2019-07-15 NOTE — PROGRESS NOTES
Subjective   Jeane Escalante is a 44 y.o. female is here today for follow-up.    Chief Complaint   Patient presents with   • Follow-up     follow up on the abnormal uterine bleeding; c/o blood in her bowels        History of Present Illness  Patient returns today for vaginal ultrasound.  She has had no more vaginal bleeding.  She continues to occasionally notice blood in her stools.  She will be referred to gastroenterology for work-up.  Patient is also complaining of urinary stress incontinence.  She will be referred to Dr. House for management of abnormal bleeding and urinary stress incontinence.  Patient has a Mirena for birth control and treatment for heavy periods.  The following portions of the patient's history were reviewed and updated as appropriate: allergies, current medications, past family history, past medical history, past social history, past surgical history and problem list.    Review of Systems - Negative except for present illness    Objective   General:  well developed; well nourished  no acute distress   Skin:  No suspicious lesions seen   Thyroid: not examined   Breasts:  Not performed.   Abdomen: Not performed.   Heart: Not examined   Lungs: Not examined   Pelvis: Not performed.         Assessment/Plan   Jeane was seen today for follow-up.    Diagnoses and all orders for this visit:    Abnormal uterine bleeding (AUB)  -     Ambulatory Referral to Gynecology    IUD check up    Rectal bleeding  -     Ambulatory Referral to Gastroenterology    Stress incontinence in female  -     Ambulatory Referral to Gynecology      Ultrasound shows IUD proper position  Refer to GYN gastroenterology for definitive treatment    Christophe Lombardo MD

## 2019-07-17 ENCOUNTER — TELEPHONE (OUTPATIENT)
Dept: OBSTETRICS AND GYNECOLOGY | Facility: CLINIC | Age: 44
End: 2019-07-17

## 2019-07-17 NOTE — TELEPHONE ENCOUNTER
Patient was called to discuss the Pap smear result.  There was no answer but a message was left to return the call.

## 2019-07-17 NOTE — TELEPHONE ENCOUNTER
Patients call was returned and there was no answer.  Message was left for her to give the office a call back.

## 2019-07-17 NOTE — TELEPHONE ENCOUNTER
Dr. Lombardo patient:    Dr. Lombardo left message for patient to call him back and then she asked for Claire.    Callback number is: [phone].    The patient returned to telephone call.  Her Pap smear was discussed.  Her Pap smear was read as negative but HPV non-16 non-18 was positive.  We will repeat the Pap smear within 1 year.  An appointment was made.

## 2019-07-29 ENCOUNTER — APPOINTMENT (OUTPATIENT)
Dept: LAB | Facility: HOSPITAL | Age: 44
End: 2019-07-29

## 2019-07-29 ENCOUNTER — OFFICE VISIT (OUTPATIENT)
Dept: OBSTETRICS AND GYNECOLOGY | Facility: CLINIC | Age: 44
End: 2019-07-29

## 2019-07-29 VITALS
SYSTOLIC BLOOD PRESSURE: 104 MMHG | DIASTOLIC BLOOD PRESSURE: 60 MMHG | WEIGHT: 150 LBS | BODY MASS INDEX: 24.11 KG/M2 | HEIGHT: 66 IN

## 2019-07-29 DIAGNOSIS — N39.3 SUI (STRESS URINARY INCONTINENCE, FEMALE): Primary | ICD-10-CM

## 2019-07-29 DIAGNOSIS — N92.6 IRREGULAR MENSES: ICD-10-CM

## 2019-07-29 PROBLEM — R31.21 ASYMPTOMATIC MICROSCOPIC HEMATURIA: Status: ACTIVE | Noted: 2019-07-29

## 2019-07-29 LAB
BACTERIA UR QL AUTO: NORMAL /HPF
BILIRUB UR QL STRIP: NEGATIVE
CLARITY UR: CLEAR
COLOR UR: YELLOW
GLUCOSE UR STRIP-MCNC: NEGATIVE MG/DL
HGB UR QL STRIP.AUTO: ABNORMAL
HYALINE CASTS UR QL AUTO: NORMAL /LPF
KETONES UR QL STRIP: NEGATIVE
LEUKOCYTE ESTERASE UR QL STRIP.AUTO: NEGATIVE
NITRITE UR QL STRIP: NEGATIVE
PH UR STRIP.AUTO: 6.5 [PH] (ref 5–8)
PROT UR QL STRIP: NEGATIVE
RBC # UR: NORMAL /HPF
REF LAB TEST METHOD: NORMAL
SP GR UR STRIP: 1.01 (ref 1–1.03)
SQUAMOUS #/AREA URNS HPF: NORMAL /HPF
UROBILINOGEN UR QL STRIP: ABNORMAL
WBC UR QL AUTO: NORMAL /HPF

## 2019-07-29 PROCEDURE — 99202 OFFICE O/P NEW SF 15 MIN: CPT | Performed by: OBSTETRICS & GYNECOLOGY

## 2019-07-29 PROCEDURE — 81001 URINALYSIS AUTO W/SCOPE: CPT | Performed by: OBSTETRICS & GYNECOLOGY

## 2019-07-29 RX ORDER — ACETAMINOPHEN 500 MG
500 TABLET ORAL EVERY 6 HOURS PRN
COMMUNITY
End: 2020-01-27

## 2019-07-30 ENCOUNTER — TELEPHONE (OUTPATIENT)
Dept: OBSTETRICS AND GYNECOLOGY | Facility: CLINIC | Age: 44
End: 2019-07-30

## 2019-07-30 PROBLEM — Z97.5 IUD (INTRAUTERINE DEVICE) IN PLACE: Status: ACTIVE | Noted: 2019-07-30

## 2019-07-30 NOTE — PROGRESS NOTES
Subjective   Chief Complaint   Patient presents with   • Vaginal Bleeding     with IC   • Headache   • Urinary Incontinence      Jeane Escalante is a 44 y.o. year old .  Patient's last menstrual period was 2019.  She presents to be seen because of problems with urinary leakage.  She is also had some breakthrough bleeding with Mirena.  Ultrasound was obtained.  Shows a retroverted uterus with positive IUD.  She is had a Pap smear checked.  She has not done any Kegel exercises.  She is here today with her daughter.  She is had a bad year this year.  She has had to do with some serious illnesses she lost her job force her son tried to commit suicide.  Symptoms: positive :  NELIA is present but it IS NOT effecting her ADL's; is been going on for about 1 to 2 months.  She first noticed that when she would wipe she would leak a little bit then when she stood up it would run down her leg.  On further questioning she mentions that maybe she had occasional leakage when she would cough laugh or sneeze for the past 6 months or so.  She has a 1 pack/day smoker.  However as above this is a stressful time in year for her so she is not sure she can quit smoking.  Discussed that this coughing may aggravate her urinary leakage.                     Negative: dysuria, frequency or hematuria  Severity : moderate  Associated factors: none known; she has gained some weight this past year due to stress in the mention that abdominal weight gain could worsen stress incontinence.  Therapies: none; she has not tried Kegel's or does not remember doing those after her 4 vaginal deliveries.  Children's weight ranged from 6 pounds to 8 pounds 4 ounces.                            The following portions of the patient's history were reviewed and updated as appropriate:She  has a past medical history of Anxiety and Depression.  She does not have any pertinent problems on file.  She  has no past surgical history on file.  Her family history  "includes Breast cancer (age of onset: 40) in her paternal aunt; Heart disease in her paternal grandfather; Obesity in her maternal grandmother; Other in her paternal aunt; Ovarian cysts in her sister.  She  reports that she has been smoking.  She has a 20.00 pack-year smoking history. She has never used smokeless tobacco. She reports that she drinks alcohol. She reports that she does not use drugs.  She is allergic to latex; zithromax [azithromycin]; sudafed [pseudoephedrine]; and sulfa antibiotics.    Current Outpatient Medications:   •  acetaminophen (TYLENOL) 500 MG tablet, Take 500 mg by mouth Every 6 (Six) Hours As Needed for Mild Pain ., Disp: , Rfl:   •  levonorgestrel (MIRENA, 52 MG,) 20 MCG/24HR IUD, 1 each by Intrauterine route 1 (One) Time., Disp: , Rfl:   Review of Systems GYN questionnaire is positive for STD and abnormal Pap smear.  Negative x6.  Obstetrical history as above for vaginal deliveries.  Medical illnesses positive for depression/anxiety negative x7.  System review is positive for fatigue, cough, constipation, chest pain headaches (yesterday and today) bruising and urinary leakage.     Objective   /60   Ht 167 cm (65.75\")   Wt 68 kg (150 lb)   LMP 07/28/2019 Comment: mirena  Breastfeeding? No   BMI 24.40 kg/m²     General:  well developed; well nourished  no acute distress  appears stated age   Skin:  No suspicious lesions seen   Thyroid: not examined   Lungs:  breathing is unlabored   Heart:  Not performed.   Abdomen: Not performed.   Pelvis: Not performed.     Lab Review   Pap test    Imaging   Pelvic ultrasound report       Assessment   1. Stress urinary incontinence increasing over the past 1 to 2-month duration.  May have been present for about 6 months.  Currently on her menses so she declined a pelvic examination today.  2. Breakthrough bleeding which may or may not be associated with her Mirena IUD #2.  Recent normal Pap test.  3. 1 pack/day smoker and history of weight " gain mostly abdominal.     Plan   1.  Given information regarding Kegel exercises so that she can try to do these for 5 minutes a day and as needed.  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  Like her to do timed voiding and avoid liquids at nighttime  2.  Follow-up in a few months to be sure this is improving  Ideally stop smoking  Check urinalysis to make sure this is not related to a bladder infection.      Orders Placed This Encounter   Procedures   • Urinalysis without microscopic (no culture) - Urine, Clean Catch   • Urinalysis, Microscopic Only - Urine, Clean Catch   • Urinalysis With Microscopic - Urine, Clean Catch     No orders of the defined types were placed in this encounter.           .  This note was electronically signed.    Gian House MD  July 29, 2019

## 2019-07-30 NOTE — TELEPHONE ENCOUNTER
PT IS CALLING BACK MISSED YOUR CALL - SHE STATES TO PLEASE CALL BACK AND IF SHE DOESN'T ANSWER LEAVE HER A MESSAGE ON HER RECORDER ABOUT WHAT YOU NEED

## 2019-07-30 NOTE — PROGRESS NOTES
Please let her know that her urinalysis appears negative no bacteria seen.  Small amount of blood noted may be related to her menstrual period.  Thank

## 2019-09-09 ENCOUNTER — OFFICE VISIT (OUTPATIENT)
Dept: RETAIL CLINIC | Facility: CLINIC | Age: 44
End: 2019-09-09

## 2019-09-09 VITALS
TEMPERATURE: 98.3 F | SYSTOLIC BLOOD PRESSURE: 128 MMHG | BODY MASS INDEX: 19.85 KG/M2 | HEIGHT: 68 IN | HEART RATE: 72 BPM | OXYGEN SATURATION: 94 % | WEIGHT: 131 LBS | DIASTOLIC BLOOD PRESSURE: 64 MMHG

## 2019-09-09 DIAGNOSIS — H60.501 ACUTE OTITIS EXTERNA OF RIGHT EAR, UNSPECIFIED TYPE: Primary | ICD-10-CM

## 2019-09-09 PROCEDURE — 99213 OFFICE O/P EST LOW 20 MIN: CPT | Performed by: NURSE PRACTITIONER

## 2019-09-09 RX ORDER — FLUTICASONE PROPIONATE 50 MCG
1 SPRAY, SUSPENSION (ML) NASAL DAILY
Qty: 1 BOTTLE | Refills: 0 | Status: SHIPPED | OUTPATIENT
Start: 2019-09-09 | End: 2019-09-19

## 2019-09-09 RX ORDER — CIPROFLOXACIN AND DEXAMETHASONE 3; 1 MG/ML; MG/ML
4 SUSPENSION/ DROPS AURICULAR (OTIC) 2 TIMES DAILY
Qty: 7.5 ML | Refills: 0 | Status: SHIPPED | OUTPATIENT
Start: 2019-09-09 | End: 2019-09-14

## 2019-09-09 NOTE — PROGRESS NOTES
"Earache (RIGHT)      Subjective   Jeane Escalante is a 44 y.o. female.     Earache    There is pain in the right ear. This is a new problem. The current episode started in the past 7 days. The problem occurs constantly. The problem has been gradually worsening. There has been no fever. The pain is moderate. Associated symptoms include headaches and rhinorrhea. Pertinent negatives include no abdominal pain, coughing, diarrhea, ear discharge, hearing loss, neck pain, rash, sore throat or vomiting. She has tried nothing for the symptoms. There is no history of a chronic ear infection, hearing loss or a tympanostomy tube.      Patient reports onset of right ear pain, headache, sinus pressure and congestion, and runny nose that started in the past week, gradually worsening. She does report vomiting yesterday, with last emesis around noon and none since, though she states she still \"feels weak\".   Patient Active Problem List   Diagnosis   • NELIA (stress urinary incontinence, female)   • Asymptomatic microscopic hematuria -chronic teen etc   • IUD (Mirena #2) in place       Allergies   Allergen Reactions   • Latex Hives   • Zithromax [Azithromycin] Dizziness   • Sudafed [Pseudoephedrine] Rash   • Sulfa Antibiotics Rash        Current Outpatient Medications on File Prior to Visit   Medication Sig Dispense Refill   • acetaminophen (TYLENOL) 500 MG tablet Take 500 mg by mouth Every 6 (Six) Hours As Needed for Mild Pain .     • levonorgestrel (MIRENA, 52 MG,) 20 MCG/24HR IUD 1 each by Intrauterine route 1 (One) Time.       No current facility-administered medications on file prior to visit.        Past Medical History:   Diagnosis Date   • Anxiety     h/o   • Depression     h/o       History reviewed. No pertinent surgical history.    Family History   Problem Relation Age of Onset   • Obesity Maternal Grandmother         poss colon cancer   • Heart disease Paternal Grandfather    • Ovarian cysts Sister         s/p hysterectomy   • " Breast cancer Paternal Aunt 40   • Other Paternal Aunt          rare disease 60's   • Osteoporosis Neg Hx        Social History     Socioeconomic History   • Marital status:      Spouse name: Not on file   • Number of children: Not on file   • Years of education: Not on file   • Highest education level: Not on file   Tobacco Use   • Smoking status: Current Every Day Smoker     Packs/day: 1.00     Years: 20.00     Pack years: 20.00   • Smokeless tobacco: Never Used   Substance and Sexual Activity   • Alcohol use: Yes     Comment: OCCASIONALLY   • Drug use: No   • Sexual activity: Yes     Partners: Male     Birth control/protection: IUD       Travel:  No recent travel within the last 21 days outside the [place] Denies recent travel to one of the following [place]  Countries:  [place], [place], [place], or [place].  Denies contact with anyone who has traveled to one of the following [place]  Countries: [place], [place], [place], or [place] within the last 21 days and is known or suspected to have Ebola.  Denies having had any contact with the human remains, blood or any bodily fluids of someone who is known or suspected to have Ebola within the last 21 days.     OB History      Para Term  AB Living    4 4 4     4    SAB TAB Ectopic Molar Multiple Live Births              4          Review of Systems   Constitutional: Negative for chills, fatigue and fever.   HENT: Positive for congestion, ear pain, rhinorrhea and sinus pressure. Negative for ear discharge, hearing loss, postnasal drip, sinus pain, sneezing, sore throat, tinnitus, trouble swallowing and voice change.    Respiratory: Negative for cough, chest tightness, shortness of breath and wheezing.    Gastrointestinal: Negative for abdominal pain, diarrhea, nausea and vomiting.   Musculoskeletal: Negative for neck pain.   Skin: Negative for rash.   Neurological: Positive for headaches. Negative for dizziness, tremors, seizures,  "syncope, facial asymmetry, speech difficulty, weakness, light-headedness and numbness.   All other systems reviewed and are negative.      /64   Pulse 72   Temp 98.3 °F (36.8 °C) (Oral)   Ht 172.7 cm (68\")   Wt 59.4 kg (131 lb)   SpO2 94%   Breastfeeding? No   BMI 19.92 kg/m²     Objective   Physical Exam   Constitutional: She is oriented to person, place, and time. She appears well-developed and well-nourished. No distress.   HENT:   Head: Normocephalic and atraumatic.   Right Ear: Hearing, external ear and ear canal normal. There is swelling (mild, pre-auricular) and tenderness. No drainage. Tympanic membrane is injected, erythematous and bulging.   Left Ear: Hearing, tympanic membrane, external ear and ear canal normal.   Nose: Rhinorrhea present. No mucosal edema. Right sinus exhibits no maxillary sinus tenderness and no frontal sinus tenderness. Left sinus exhibits no maxillary sinus tenderness and no frontal sinus tenderness.   Mouth/Throat: Uvula is midline, oropharynx is clear and moist and mucous membranes are normal.   Eyes: Conjunctivae and EOM are normal. Pupils are equal, round, and reactive to light. Right eye exhibits no discharge. Left eye exhibits no discharge. No scleral icterus.   Neck: Normal range of motion. Neck supple.   Cardiovascular: Normal rate, regular rhythm and normal heart sounds.   Pulmonary/Chest: Effort normal and breath sounds normal. No respiratory distress. She has no wheezes. She has no rales.   Abdominal: Soft. Bowel sounds are normal.   Musculoskeletal: Normal range of motion.   Lymphadenopathy:     She has no cervical adenopathy.   Neurological: She is alert and oriented to person, place, and time.   Skin: Skin is warm and dry. No rash noted. She is not diaphoretic.   Psychiatric: She has a normal mood and affect. Her behavior is normal.       Assessment/Plan   Jeane was seen today for earache.    Diagnoses and all orders for this visit:    Acute otitis externa " of right ear, unspecified type  -     fluticasone (FLONASE) 50 MCG/ACT nasal spray; 1 spray into the nostril(s) as directed by provider Daily for 10 days.  -     ciprofloxacin-dexamethasone (CIPRODEX) 0.3-0.1 % otic suspension; Administer 4 drops to the right ear 2 (Two) Times a Day for 5 days.      Instructed to take Tylenol and/or Motrin for pain; increase water intake; follow up with PCP for no improvement in 2-3 days or sooner for new or worsening symptoms. Verbalized understanding.      No Follow-up on file.

## 2019-09-09 NOTE — PATIENT INSTRUCTIONS
"Otitis Externa    Otitis externa is an infection of the outer ear canal. The outer ear canal is the area between the outside of the ear and the eardrum. Otitis externa is sometimes called \"swimmer's ear.\"  What are the causes?  This condition may be caused by:  · Swimming in dirty water.  · Moisture in the ear.  · An injury to the inside of the ear.  · An object stuck in the ear.  · A cut or scrape on the outside of the ear.  What increases the risk?  This condition is more likely to develop in swimmers.  What are the signs or symptoms?  The first symptom of this condition is often itching in the ear. Later signs and symptoms include:  · Swelling of the ear.  · Redness in the ear.  · Ear pain. The pain may get worse when you pull on your ear.  · Pus coming from the ear.  How is this diagnosed?  This condition may be diagnosed by examining the ear and testing fluid from the ear for bacteria and funguses.  How is this treated?  This condition may be treated with:  · Antibiotic ear drops. These are often given for 10-14 days.  · Medicine to reduce itching and swelling.  Follow these instructions at home:  · If you were prescribed antibiotic ear drops, apply them as told by your health care provider. Do not stop using the antibiotic even if your condition improves.  · Take over-the-counter and prescription medicines only as told by your health care provider.  · Keep all follow-up visits as told by your health care provider. This is important.  How is this prevented?  · Keep your ear dry. Use the corner of a towel to dry your ear after you swim or bathe.  · Avoid scratching or putting things in your ear. Doing these things can damage the ear canal or remove the protective wax that lines it, which makes it easier for bacteria and funguses to grow.  · Avoid swimming in lakes, polluted water, or pools that may not have the right amount of chlorine.  · Consider making ear drops and putting 3 or 4 drops in each ear after you " swim. Ask your health care provider about how you can make ear drops.  Contact a health care provider if:  · You have a fever.  · After 3 days your ear is still red, swollen, painful, or draining pus.  · Your redness, swelling, or pain gets worse.  · You have a severe headache.  · You have redness, swelling, pain, or tenderness in the area behind your ear.  This information is not intended to replace advice given to you by your health care provider. Make sure you discuss any questions you have with your health care provider.  Document Released: 12/18/2006 Document Revised: 01/24/2017 Document Reviewed: 09/26/2016  [employer] Interactive Patient Education © 2019 [employer]

## 2020-01-27 ENCOUNTER — OFFICE VISIT (OUTPATIENT)
Dept: RETAIL CLINIC | Facility: CLINIC | Age: 45
End: 2020-01-27

## 2020-01-27 VITALS
RESPIRATION RATE: 20 BRPM | TEMPERATURE: 98 F | DIASTOLIC BLOOD PRESSURE: 70 MMHG | WEIGHT: 153 LBS | SYSTOLIC BLOOD PRESSURE: 112 MMHG | BODY MASS INDEX: 23.26 KG/M2 | OXYGEN SATURATION: 99 % | HEART RATE: 62 BPM

## 2020-01-27 DIAGNOSIS — R68.89 FLU-LIKE SYMPTOMS: ICD-10-CM

## 2020-01-27 DIAGNOSIS — B34.9 VIRAL ILLNESS: ICD-10-CM

## 2020-01-27 LAB
EXPIRATION DATE: NORMAL
FLUAV AG NPH QL: NEGATIVE
FLUBV AG NPH QL: NEGATIVE
INTERNAL CONTROL: NORMAL
Lab: NORMAL

## 2020-01-27 PROCEDURE — 87804 INFLUENZA ASSAY W/OPTIC: CPT | Performed by: NURSE PRACTITIONER

## 2020-01-27 PROCEDURE — 99213 OFFICE O/P EST LOW 20 MIN: CPT | Performed by: NURSE PRACTITIONER

## 2020-01-27 RX ORDER — OSELTAMIVIR PHOSPHATE 75 MG/1
CAPSULE ORAL
COMMUNITY
Start: 2020-01-24 | End: 2020-11-23

## 2020-01-27 RX ORDER — AMOXICILLIN 500 MG/1
CAPSULE ORAL
COMMUNITY
Start: 2020-01-20 | End: 2020-09-21

## 2020-01-27 RX ORDER — IBUPROFEN 800 MG/1
TABLET ORAL SEE ADMIN INSTRUCTIONS
COMMUNITY
Start: 2020-01-20 | End: 2023-01-23

## 2020-01-27 NOTE — PATIENT INSTRUCTIONS
Viral Illness, Adult  Viruses are tiny germs that can get into a person's body and cause illness. There are many different types of viruses, and they cause many types of illness. Viral illnesses can range from mild to severe. They can affect various parts of the body.  Common illnesses that are caused by a virus include colds and the flu. Viral illnesses also include serious conditions such as HIV/AIDS (human immunodeficiency virus/acquired immunodeficiency syndrome). A few viruses have been linked to certain cancers.  What are the causes?  Many types of viruses can cause illness. Viruses invade cells in your body, multiply, and cause the infected cells to malfunction or die. When the cell dies, it releases more of the virus. When this happens, you develop symptoms of the illness, and the virus continues to spread to other cells. If the virus takes over the function of the cell, it can cause the cell to divide and grow out of control, as is the case when a virus causes cancer.  Different viruses get into the body in different ways. You can get a virus by:  · Swallowing food or water that is contaminated with the virus.  · Breathing in droplets that have been coughed or sneezed into the air by an infected person.  · Touching a surface that has been contaminated with the virus and then touching your eyes, nose, or mouth.  · Being bitten by an insect or animal that carries the virus.  · Having sexual contact with a person who is infected with the virus.  · Being exposed to blood or fluids that contain the virus, either through an open cut or during a transfusion.  If a virus enters your body, your body's defense system (immune system) will try to fight the virus. You may be at higher risk for a viral illness if your immune system is weak.  What are the signs or symptoms?  Symptoms vary depending on the type of virus and the location of the cells that it invades. Common symptoms of the main types of viral illnesses  include:  Cold and flu viruses  · Fever.  · Headache.  · Sore throat.  · Muscle aches.  · Nasal congestion.  · Cough.  Digestive system (gastrointestinal) viruses  · Fever.  · Abdominal pain.  · Nausea.  · Diarrhea.  Liver viruses (hepatitis)  · Loss of appetite.  · Tiredness.  · Yellowing of the skin (jaundice).  Brain and spinal cord viruses  · Fever.  · Headache.  · Stiff neck.  · Nausea and vomiting.  · Confusion or sleepiness.  Skin viruses  · Warts.  · Itching.  · Rash.  Sexually transmitted viruses  · Discharge.  · Swelling.  · Redness.  · Rash.  How is this treated?  Viruses can be difficult to treat because they live within cells. Antibiotic medicines do not treat viruses because these drugs do not get inside cells. Treatment for a viral illness may include:  · Resting and drinking plenty of fluids.  · Medicines to relieve symptoms. These can include over-the-counter medicine for pain and fever, medicines for cough or congestion, and medicines to relieve diarrhea.  · Antiviral medicines. These drugs are available only for certain types of viruses. They may help reduce flu symptoms if taken early. There are also many antiviral medicines for hepatitis and HIV/AIDS.  Some viral illnesses can be prevented with vaccinations. A common example is the flu shot.  Follow these instructions at home:  Medicines    · Take over-the-counter and prescription medicines only as told by your health care provider.  · If you were prescribed an antiviral medicine, take it as told by your health care provider. Do not stop taking the medicine even if you start to feel better.  · Be aware of when antibiotics are needed and when they are not needed. Antibiotics do not treat viruses. If your health care provider thinks that you may have a bacterial infection as well as a viral infection, you may get an antibiotic.  ? Do not ask for an antibiotic prescription if you have been diagnosed with a viral illness. That will not make your  illness go away faster.  ? Frequently taking antibiotics when they are not needed can lead to antibiotic resistance. When this develops, the medicine no longer works against the bacteria that it normally fights.  General instructions  · Drink enough fluids to keep your urine clear or pale yellow.  · Rest as much as possible.  · Return to your normal activities as told by your health care provider. Ask your health care provider what activities are safe for you.  · Keep all follow-up visits as told by your health care provider. This is important.  How is this prevented?  Take these actions to reduce your risk of viral infection:  · Eat a healthy diet and get enough rest.  · Wash your hands often with soap and water. This is especially important when you are in public places. If soap and water are not available, use hand .  · Avoid close contact with friends and family who have a viral illness.  · If you travel to areas where viral gastrointestinal infection is common, avoid drinking water or eating raw food.  · Keep your immunizations up to date. Get a flu shot every year as told by your health care provider.  · Do not share toothbrushes, nail clippers, razors, or needles with other people.  · Always practice safe sex.    Contact a health care provider if:  · You have symptoms of a viral illness that do not go away.  · Your symptoms come back after going away.  · Your symptoms get worse.  Get help right away if:  · You have trouble breathing.  · You have a severe headache or a stiff neck.  · You have severe vomiting or abdominal pain.  This information is not intended to replace advice given to you by your health care provider. Make sure you discuss any questions you have with your health care provider.  Document Released: 04/28/2017 Document Revised: 05/31/2017 Document Reviewed: 04/28/2017  [employer] Interactive Patient Education © 2019 [employer]

## 2020-01-27 NOTE — PROGRESS NOTES
"Flu Symptoms      Subjective   Jeane Escalante is a 44 y.o. female.     Flu Symptoms   This is a new problem. The current episode started yesterday. The problem occurs constantly. The problem has been unchanged. Associated symptoms include chills, coughing, fatigue, headaches, nausea and a sore throat. Pertinent negatives include no abdominal pain, anorexia, arthralgias, congestion, diaphoresis, fever, joint swelling, myalgias, neck pain, numbness, rash, swollen glands, urinary symptoms, vertigo, visual change, vomiting or weakness. Nothing aggravates the symptoms. She has tried NSAIDs (Tamiflu and Amoxicillin) for the symptoms. The treatment provided no relief.      Patient presents to clinic for flu symptoms that started yesterday. She is currently taking Tamiflu prophylaxis starting on 1/24/2020, secondary to child in the home was dx with flu that day. She insisted on being tested as \"she was not symptomatic on Friday\" and now she is.  Patient Active Problem List   Diagnosis   • NELIA (stress urinary incontinence, female)   • Asymptomatic microscopic hematuria -chronic teen etc   • IUD (Mirena #2) in place       Allergies   Allergen Reactions   • Latex Hives   • Zithromax [Azithromycin] Dizziness   • Sudafed [Pseudoephedrine] Rash   • Sulfa Antibiotics Rash        Current Outpatient Medications on File Prior to Visit   Medication Sig Dispense Refill   • amoxicillin (AMOXIL) 500 MG capsule TAKE 2 CAPSULES BY MOUTH TO START THEN 1 EVERY 8 HOURS UNTIL GONE     • ibuprofen (ADVIL,MOTRIN) 800 MG tablet Take  by mouth See Admin Instructions. Take 1 tablet by mouth every 6-8 hours as needed for pain     • levonorgestrel (MIRENA, 52 MG,) 20 MCG/24HR IUD 1 each by Intrauterine route 1 (One) Time.     • oseltamivir (TAMIFLU) 75 MG capsule TK 1 C PO D FOR 10 DAYS     • [DISCONTINUED] acetaminophen (TYLENOL) 500 MG tablet Take 500 mg by mouth Every 6 (Six) Hours As Needed for Mild Pain .       No current facility-administered " medications on file prior to visit.        Past Medical History:   Diagnosis Date   • Anxiety     h/o   • Depression     h/o       History reviewed. No pertinent surgical history.    Family History   Problem Relation Age of Onset   • Obesity Maternal Grandmother         poss colon cancer   • Heart disease Paternal Grandfather    • Ovarian cysts Sister         s/p hysterectomy   • Breast cancer Paternal Aunt 40   • Other Paternal Aunt          rare disease 60's   • Osteoporosis Neg Hx        Social History     Socioeconomic History   • Marital status:      Spouse name: Not on file   • Number of children: Not on file   • Years of education: Not on file   • Highest education level: Not on file   Tobacco Use   • Smoking status: Current Every Day Smoker     Packs/day: 1.00     Years: 20.00     Pack years: 20.00   • Smokeless tobacco: Never Used   Substance and Sexual Activity   • Alcohol use: Yes     Comment: OCCASIONALLY   • Drug use: No   • Sexual activity: Yes     Partners: Male     Birth control/protection: IUD       Travel:  No recent travel within the last 21 days outside the [place] Denies recent travel to one of the following [place]  Countries:  [place], [place], [place], or [place].  Denies contact with anyone who has traveled to one of the following [place]  Countries: [place], [place], [place], or [place] within the last 21 days and is known or suspected to have Ebola.  Denies having had any contact with the human remains, blood or any bodily fluids of someone who is known or suspected to have Ebola within the last 21 days.     OB History        4    Para   4    Term   4            AB        Living   4       SAB        TAB        Ectopic        Molar        Multiple        Live Births   4                Review of Systems   Constitutional: Positive for chills and fatigue. Negative for activity change, appetite change, diaphoresis and fever.   HENT: Positive for ear pain and sore  throat. Negative for congestion, ear discharge, postnasal drip, rhinorrhea, sinus pressure, sinus pain, sneezing, tinnitus, trouble swallowing and voice change.    Respiratory: Positive for cough. Negative for chest tightness, shortness of breath and wheezing.    Gastrointestinal: Positive for nausea. Negative for abdominal pain, anorexia, diarrhea and vomiting.   Musculoskeletal: Negative for arthralgias, joint swelling, myalgias and neck pain.   Skin: Negative for rash.   Neurological: Positive for headaches. Negative for dizziness, vertigo, facial asymmetry, weakness, light-headedness and numbness.   All other systems reviewed and are negative.      /70   Pulse 62   Temp 98 °F (36.7 °C) (Oral)   Resp 20   Wt 69.4 kg (153 lb)   SpO2 99%   Breastfeeding No   BMI 23.26 kg/m²     Objective   Physical Exam   Constitutional: She is oriented to person, place, and time. She appears well-developed and well-nourished. No distress.   HENT:   Head: Normocephalic and atraumatic.   Right Ear: Hearing, external ear and ear canal normal. Tympanic membrane is erythematous.   Left Ear: Hearing, external ear and ear canal normal. Tympanic membrane is erythematous.   Nose: Nose normal. No mucosal edema or rhinorrhea.   Mouth/Throat: Uvula is midline and mucous membranes are normal. Posterior oropharyngeal erythema (mild) present. No oropharyngeal exudate, posterior oropharyngeal edema or tonsillar abscesses.   Eyes: Pupils are equal, round, and reactive to light. Conjunctivae and EOM are normal. Right eye exhibits no discharge. Left eye exhibits no discharge. No scleral icterus.   Neck: Normal range of motion. Neck supple.   Cardiovascular: Normal rate, regular rhythm and normal heart sounds.   Pulmonary/Chest: Effort normal and breath sounds normal. No stridor. No tachypnea. No respiratory distress. She has no decreased breath sounds. She has no wheezes. She has no rhonchi. She has no rales.   NO cough noted on exam    Musculoskeletal: Normal range of motion.   Lymphadenopathy:     She has no cervical adenopathy.   Neurological: She is alert and oriented to person, place, and time.   Skin: Skin is warm and dry. No rash noted. She is not diaphoretic.   Psychiatric: She has a normal mood and affect. Her behavior is normal.   Vitals reviewed.      Assessment/Plan   Jeane was seen today for flu symptoms.    Diagnoses and all orders for this visit:    Viral illness    Flu-like symptoms  -     POC Influenza A / B      Discussed with patient that her flu test is negative; instructed to continue her Tamiflu as prescribed, rest; increase water intake; avoid smoking, use Robitussin per package directions for cough; alternate Tylenol and Motrin for pain; warm salt water gargles and throat sprays for throat pain; follow up with PCP as needed. Verbalized understanding.    Results for orders placed or performed in visit on 01/27/20   POC Influenza A / B   Result Value Ref Range    Rapid Influenza A Ag Negative Negative    Rapid Influenza B Ag Negative Negative    Internal Control Passed Passed    Lot Number [id number]     Expiration Date 11/30/2020        No follow-ups on file.

## 2020-07-24 ENCOUNTER — OFFICE VISIT (OUTPATIENT)
Dept: OBSTETRICS AND GYNECOLOGY | Facility: CLINIC | Age: 45
End: 2020-07-24

## 2020-07-24 VITALS
RESPIRATION RATE: 14 BRPM | DIASTOLIC BLOOD PRESSURE: 70 MMHG | SYSTOLIC BLOOD PRESSURE: 106 MMHG | BODY MASS INDEX: 22.76 KG/M2 | HEIGHT: 68 IN | WEIGHT: 150.2 LBS

## 2020-07-24 DIAGNOSIS — Z30.432 ENCOUNTER FOR IUD REMOVAL: Primary | ICD-10-CM

## 2020-07-24 DIAGNOSIS — R87.810 CERVICAL HIGH RISK HPV (HUMAN PAPILLOMAVIRUS) TEST POSITIVE: ICD-10-CM

## 2020-07-24 PROCEDURE — 58301 REMOVE INTRAUTERINE DEVICE: CPT | Performed by: OBSTETRICS & GYNECOLOGY

## 2020-07-24 NOTE — PROGRESS NOTES
IUD Removal    Date of procedure:  7/24/2020    Risks and benefits discussed? yes  All questions answered? yes  Consents given by The patient  Written consent obtained? yes  Reason for removal: Device expiration    Local anesthesia used:  no    Procedure documentation:    A speculum was placed in order to view the cervix.  A tenaculum did not need to be placed on the anterior cervical lip.  Cervical dilation did not need to be performed in order to access the string.  The IUD string was easily seen.  The string was grasped and the IUD was removed without difficulty.  The IUD did not appear to be adherent to the uterine cavity. It was removed intact.    She tolerated the procedure without any difficulty.     Post procedure instructions: Patient notified to call with heavy bleeding, fever or increasing pain.    Follow up for annual exam 2 months, patient's Pap smear last year was positive for non-16 non-18 HPV and needs to be repeated    This note was electronically signed.    Christophe Lombardo MD  .  July 24, 2020      
no

## 2020-09-21 ENCOUNTER — OFFICE VISIT (OUTPATIENT)
Dept: OBSTETRICS AND GYNECOLOGY | Facility: CLINIC | Age: 45
End: 2020-09-21

## 2020-09-21 VITALS
SYSTOLIC BLOOD PRESSURE: 102 MMHG | DIASTOLIC BLOOD PRESSURE: 60 MMHG | BODY MASS INDEX: 22.94 KG/M2 | HEIGHT: 68 IN | RESPIRATION RATE: 14 BRPM | WEIGHT: 151.4 LBS

## 2020-09-21 DIAGNOSIS — Z01.419 WELL WOMAN EXAM WITH ROUTINE GYNECOLOGICAL EXAM: Primary | ICD-10-CM

## 2020-09-21 DIAGNOSIS — D22.9 CHANGE IN SKIN MOLE: ICD-10-CM

## 2020-09-21 PROCEDURE — 99396 PREV VISIT EST AGE 40-64: CPT | Performed by: OBSTETRICS & GYNECOLOGY

## 2020-09-21 NOTE — PROGRESS NOTES
Subjective   Chief Complaint   Patient presents with   • Gynecologic Exam     Patient is here today for her annual and pap smear, no complaints     Jeane Escalante is a 45 y.o. year old  presenting to be seen for her annual exam.  Patient had IUD removed about 2 months ago.  She is using abstinence for birth control now.  She will use condoms if needed.  She is complaining of a mole on the right leg and groin area and wants this removed.  She is having no other GYN problems    SEXUAL Hx:  She is not currently sexually active.  In the past year there has not been new sexual partners.    Condoms are not typically used.  She would not like to be screened for STD's at today's exam.  Current birth control method: abstinence.  She is happy with her current method of contraception and does not want to discuss alternative methods of contraception.  MENSTRUAL Hx:  No LMP recorded. (Menstrual status: Other).  In the past 6 months her cycles have been absent.  Her menstrual flow has been absent.   Each month on average there are roughly 0 day(s) of very heavy flow.    Intermenstrual bleeding is present.    Post-coital bleeding is absent.  Dysmenorrhea: none  PMS: is not affecting her activities of daily living  Her cycles are not a source of concern for her that she wishes to discuss today.  HEALTH Hx:  She exercises regularly:yes.  She wears her seat belt:yes.  She has concerns about domestic violence: no.  OTHER COMPLAINTS:  Nothing else    The following portions of the patient's history were reviewed and updated as appropriate:problem list, current medications, allergies, past family history, past medical history, past social history and past surgical history.    Social History    Tobacco Use      Smoking status: Current Every Day Smoker        Packs/day: 1.00        Years: 20.00        Pack years: 20      Smokeless tobacco: Never Used    Review of Systems  Review of Systems - History obtained from the patient  General  "ROS: positive for  - weight gain  Psychological ROS: positive for - anxiety and depression  ENT ROS: negative  Allergy and Immunology ROS: negative  Hematological and Lymphatic ROS: negative  Endocrine ROS: negative  Breast ROS: negative for breast lumps  Respiratory ROS: positive for - cough  Cardiovascular ROS: no chest pain or dyspnea on exertion  Gastrointestinal ROS: no abdominal pain, change in bowel habits, or black or bloody stools  Genito-Urinary ROS: positive for - incontinence  Musculoskeletal ROS: negative  Neurological ROS: no TIA or stroke symptoms  Dermatological ROS: negative        Objective   /60   Resp 14   Ht 172.7 cm (68\")   Wt 68.7 kg (151 lb 6.4 oz)   Breastfeeding No   BMI 23.02 kg/m²     General:  well developed; well nourished  no acute distress   Skin:  Mole(s) noted on extremities and Right leg near the groin, appears to be a keratosis but will refer to dermatology for treatment   Thyroid: not examined   Breasts:  Examined in supine position  Symmetric without masses or skin dimpling  Nipples normal without inversion, lesions or discharge  There are no palpable axillary nodes   Abdomen: soft, non-tender; no masses  no umbilical or inguinal hernias are present  no hepato-splenomegaly   Heart: regular rate and rhythm, S1, S2 normal, no murmur, click, rub or gallop   Lungs: clear to auscultation   Pelvis: Clinical staff was present for exam  External genitalia:  normal appearance of the external genitalia including Bartholin's and Arkoe's glands.  :  urethral meatus normal;  Vaginal:  normal pink mucosa without prolapse or lesions.  Cervix:  normal appearance. Pap smear was done  Uterus:  normal size, shape and consistency.  Adnexa:  normal bimanual exam of the adnexa.  Rectal:  digital rectal exam not performed; anus visually normal appearing.          Assessment/Plan   Jeane was seen today for gynecologic exam.    Diagnoses and all orders for this visit:    Well woman exam " with routine gynecological exam  -     Pap IG, Rfx HPV ASCU; Future    Change in skin mole         Return in 1 year or as needed  Refer to dermatology for removal of the skin lesion  This note was electronically signed.    Christophe Lombardo MD   September 21, 2020

## 2020-09-23 ENCOUNTER — TELEPHONE (OUTPATIENT)
Dept: OBSTETRICS AND GYNECOLOGY | Facility: CLINIC | Age: 45
End: 2020-09-23

## 2020-09-23 NOTE — TELEPHONE ENCOUNTER
[facility] pt called stating she had her iud removed 2 months ago and is now bleeding and cramping more than she ever has-soaking pads and tampons with in 1-2 hours. Please contact back and advise what she needs to do

## 2020-09-24 ENCOUNTER — OFFICE VISIT (OUTPATIENT)
Dept: OBSTETRICS AND GYNECOLOGY | Facility: CLINIC | Age: 45
End: 2020-09-24

## 2020-09-24 VITALS
BODY MASS INDEX: 22.7 KG/M2 | HEIGHT: 68 IN | DIASTOLIC BLOOD PRESSURE: 70 MMHG | RESPIRATION RATE: 14 BRPM | WEIGHT: 149.8 LBS | SYSTOLIC BLOOD PRESSURE: 106 MMHG

## 2020-09-24 DIAGNOSIS — N92.1 MENORRHAGIA WITH IRREGULAR CYCLE: Primary | ICD-10-CM

## 2020-09-24 PROCEDURE — 99213 OFFICE O/P EST LOW 20 MIN: CPT | Performed by: OBSTETRICS & GYNECOLOGY

## 2020-09-24 RX ORDER — TRANEXAMIC ACID 650 MG/1
TABLET ORAL
Qty: 30 TABLET | Refills: 2 | OUTPATIENT
Start: 2020-09-24 | End: 2020-11-23

## 2020-09-24 NOTE — PROGRESS NOTES
Subjective   Jeane Escalante is a 45 y.o. female is here today as a self referral.    Chief Complaint   Patient presents with   • Vaginal Bleeding     Patient is here today for heavy vaginal bleeding with moderate to severe cramping and passing clots. Patient states she is saturating a pad and tampon every 2-3 hours        History of Present Illness  Patient had her IUD removed 2 months ago and now is having a very heavy cycle.  She presents today wanting the bleeding to stop.  We will do a transvaginal ultrasound to assess endometrial thickness.  A D&C, high-dose birth control pills, Lysteda, and endometrial ablations were discussed.  Shows a thickened endometrial stripe.  We will schedule hysteroscopic D&C early next week.  Will prescribe Lysteda 1300 mg 3 times a day for up to 5 days if the insurance will cover.  The following portions of the patient's history were reviewed and updated as appropriate: allergies, current medications, past family history, past medical history, past social history, past surgical history and problem list.    Review of Systems - Negative except for present illness     Objective   General:  well developed; well nourished  no acute distress   Skin:  Not performed.   Thyroid: not examined   Breasts:  Not performed.   Abdomen: Not performed.   Heart: regular rate and rhythm, S1, S2 normal, no murmur, click, rub or gallop   Lungs: clear to auscultation   Pelvis: Not performed.         Assessment/Plan   Jeane was seen today for vaginal bleeding.    Diagnoses and all orders for this visit:    Menorrhagia with irregular cycle  -     US Non-ob Transvaginal  -     External Facility Surgical/Procedural Request; Future  -     Tranexamic Acid (Lysteda) 650 MG tablet; Take 2 tablets 3 times a day for 5 days if needed      Schedule hysteroscopic D&C    Christophe Lombardo MD

## 2020-09-27 ENCOUNTER — APPOINTMENT (OUTPATIENT)
Dept: PREADMISSION TESTING | Facility: HOSPITAL | Age: 45
End: 2020-09-27

## 2020-09-27 PROCEDURE — U0004 COV-19 TEST NON-CDC HGH THRU: HCPCS

## 2020-09-27 PROCEDURE — C9803 HOPD COVID-19 SPEC COLLECT: HCPCS

## 2020-09-28 LAB — SARS-COV-2 RNA NOSE QL NAA+PROBE: NOT DETECTED

## 2020-09-29 ENCOUNTER — LAB REQUISITION (OUTPATIENT)
Dept: LAB | Facility: HOSPITAL | Age: 45
End: 2020-09-29

## 2020-09-29 ENCOUNTER — OUTSIDE FACILITY SERVICE (OUTPATIENT)
Dept: OBSTETRICS AND GYNECOLOGY | Facility: CLINIC | Age: 45
End: 2020-09-29

## 2020-09-29 DIAGNOSIS — N92.1 EXCESSIVE AND FREQUENT MENSTRUATION WITH IRREGULAR CYCLE: ICD-10-CM

## 2020-09-29 PROCEDURE — 11403 EXC TR-EXT B9+MARG 2.1-3CM: CPT | Performed by: OBSTETRICS & GYNECOLOGY

## 2020-09-29 PROCEDURE — 58558 HYSTEROSCOPY BIOPSY: CPT | Performed by: OBSTETRICS & GYNECOLOGY

## 2020-09-29 PROCEDURE — 88305 TISSUE EXAM BY PATHOLOGIST: CPT | Performed by: OBSTETRICS & GYNECOLOGY

## 2020-09-30 ENCOUNTER — TELEPHONE (OUTPATIENT)
Dept: OBSTETRICS AND GYNECOLOGY | Facility: CLINIC | Age: 45
End: 2020-09-30

## 2020-09-30 DIAGNOSIS — Z01.419 WELL WOMAN EXAM WITH ROUTINE GYNECOLOGICAL EXAM: ICD-10-CM

## 2020-09-30 LAB
CYTO UR: NORMAL
LAB AP CASE REPORT: NORMAL
LAB AP CLINICAL INFORMATION: NORMAL
LAB AP DIAGNOSIS COMMENT: NORMAL
PATH REPORT.FINAL DX SPEC: NORMAL
PATH REPORT.GROSS SPEC: NORMAL

## 2020-09-30 NOTE — TELEPHONE ENCOUNTER
Patient was called and the results of her D&C and removal of mole from her right leg were discussed.  The path report showed a benign leiomyoma and keratosis of the right leg.  Patient's Pap smear was also abnormal patient was informed of this.  She will undergo a colposcopy on her postop return.    Christophe Lombardo MD

## 2020-10-13 ENCOUNTER — OFFICE VISIT (OUTPATIENT)
Dept: OBSTETRICS AND GYNECOLOGY | Facility: CLINIC | Age: 45
End: 2020-10-13

## 2020-10-13 VITALS
DIASTOLIC BLOOD PRESSURE: 70 MMHG | RESPIRATION RATE: 14 BRPM | BODY MASS INDEX: 22.4 KG/M2 | HEIGHT: 68 IN | SYSTOLIC BLOOD PRESSURE: 106 MMHG | WEIGHT: 147.8 LBS

## 2020-10-13 DIAGNOSIS — R87.810 ASCUS WITH POSITIVE HIGH RISK HPV CERVICAL: ICD-10-CM

## 2020-10-13 DIAGNOSIS — Z98.890 POSTOPERATIVE STATE: Primary | ICD-10-CM

## 2020-10-13 DIAGNOSIS — R87.610 ASCUS WITH POSITIVE HIGH RISK HPV CERVICAL: ICD-10-CM

## 2020-10-13 PROCEDURE — 57455 BIOPSY OF CERVIX W/SCOPE: CPT | Performed by: OBSTETRICS & GYNECOLOGY

## 2020-10-13 PROCEDURE — 99024 POSTOP FOLLOW-UP VISIT: CPT | Performed by: OBSTETRICS & GYNECOLOGY

## 2020-10-13 RX ORDER — FERROUS SULFATE 325(65) MG
325 TABLET ORAL
COMMUNITY
End: 2023-01-24

## 2020-10-13 NOTE — PROGRESS NOTES
Colposcopy    Date of procedure:  10/13/2020   Risks and benefits discussed? yes   All questions answered? yes   Consents given by: patient   Written consent obtained? yes   Pre-op indication: ASC-US with (+) HPV non 16/18          Procedure documentation:  The cervix was initially viewed colposcopically through a green filter.  The cervix was next bathed in acetic acid.   The findings were as follows:    Transformation zone seen? Yes   Findings: 1. Acetowhite noted at 6 o'clock and 12 o'clock   Ectocervical biopsies: taken from 6 o'clock and 12 o'clock.  Monsels solution was applied to the biopsy sites.   Endocervical curettage: not performed               Colposcopic Impression: 1. Mild dysplasia at worst  2. Adequate colposcopy  3. Colposcopic findings are consistent with cytology       Plan: Will base further treatment on pathology results  Post biopsy instructions given to patient.  Specimens labelled and sent to pathology.  follow-up in 1 week(s) for biopsy results  Patient will probably need the Pap smear repeated within 1 year         This note was electronically signed.    Christophe Lombardo MD    October 13, 2020

## 2020-10-13 NOTE — PROGRESS NOTES
Subjective   Jeane Escalante is a 45 y.o. female is here today for follow-up.    Chief Complaint   Patient presents with   • Post-op     No complaints   • Colposcopy   • Abnormal Pap Smear        History of Present Illness  Patient is 2 weeks post D&C and removal of pedunculated fibroid.  Patient also had a keratosis removed from her right leg.  Path report was communicated patient.  She is having no problems from surgery.  The following portions of the patient's history were reviewed and updated as appropriate: allergies, current medications, past family history, past medical history, past social history, past surgical history and problem list.    Review of Systems - Negative      Objective   General:  well developed; well nourished  no acute distress   Skin:  Not performed.   Thyroid: not examined   Breasts:  Not performed.   Abdomen: Not performed.   Heart: regular rate and rhythm, S1, S2 normal, no murmur, click, rub or gallop   Lungs: clear to auscultation   Pelvis: Not performed.         Assessment/Plan   Jeane was seen today for post-op, colposcopy and abnormal pap smear.    Diagnoses and all orders for this visit:    Postoperative state    ASCUS with positive high risk HPV cervical      Return in 1 year    Christophe Lombardo MD

## 2020-10-21 DIAGNOSIS — R87.610 ASCUS WITH POSITIVE HIGH RISK HPV CERVICAL: ICD-10-CM

## 2020-10-21 DIAGNOSIS — R87.810 ASCUS WITH POSITIVE HIGH RISK HPV CERVICAL: ICD-10-CM

## 2020-11-12 ENCOUNTER — TELEPHONE (OUTPATIENT)
Dept: OBSTETRICS AND GYNECOLOGY | Facility: CLINIC | Age: 45
End: 2020-11-12

## 2020-11-12 NOTE — TELEPHONE ENCOUNTER
[facility] pt called stating she had a D&C end of Sept due to extreme bleeding, had no bleeding in October, has started bleeding again like she was in September-extremely heavy, passing clots, and severe cramping. Wants to know what to do now. Please contact back      Patient's telephone call was returned an appointment was made for insertion of Mirena IUD.    Christophe Lombardo MD

## 2020-11-16 ENCOUNTER — OFFICE VISIT (OUTPATIENT)
Dept: OBSTETRICS AND GYNECOLOGY | Facility: CLINIC | Age: 45
End: 2020-11-16

## 2020-11-16 VITALS
RESPIRATION RATE: 14 BRPM | HEIGHT: 68 IN | DIASTOLIC BLOOD PRESSURE: 70 MMHG | SYSTOLIC BLOOD PRESSURE: 110 MMHG | WEIGHT: 149.6 LBS | BODY MASS INDEX: 22.67 KG/M2

## 2020-11-16 DIAGNOSIS — N87.0 MILD DYSPLASIA OF CERVIX (CIN I): ICD-10-CM

## 2020-11-16 DIAGNOSIS — Z30.430 ENCOUNTER FOR IUD INSERTION: Primary | ICD-10-CM

## 2020-11-16 LAB
B-HCG UR QL: NEGATIVE
INTERNAL NEGATIVE CONTROL: NORMAL
INTERNAL POSITIVE CONTROL: NORMAL
Lab: NORMAL

## 2020-11-16 PROCEDURE — 58300 INSERT INTRAUTERINE DEVICE: CPT | Performed by: OBSTETRICS & GYNECOLOGY

## 2020-11-16 PROCEDURE — 81025 URINE PREGNANCY TEST: CPT | Performed by: OBSTETRICS & GYNECOLOGY

## 2020-11-16 NOTE — PROGRESS NOTES
IUD Insertion    Last menstrual period was 1 week ago, her cycle ended yesterday, last intercourse was in June 2020, urine pregnancy test today is negative    Date of procedure:  11/16/2020    Risks and benefits discussed? yes  All questions answered? yes  Consents given by The patient  Written consent obtained? yes    Local anesthesia used:  no    Procedure documentation:    After verifying the patient had a low probability of being pregnant and met the criteria for insertion, a sterile speculum has placed and the cervix was cleansed with an antiseptic solution.  Vaginal discharge was scant.  The anterior lip of the cervix was grasped with a tenaculum and the uterine cavity was gently sounded. There was no difficulty passing the sound through the cervix.  Cervical dilation did not need to be performed prior to placing the IUD.  The uterus was retroverted and sounded to 10 cms.  The Mirena was then prepared per the manufacturers instructions.    The Mirena was advanced to a point 2 cms from the fundus and then the arms were released from the sheath.  The device was advanced to the fundus and the device was released fully from the sheath. The string was cut 2 cms in length.  Bleeding from the cervix was scant.    She tolerated the procedure without any difficulty.     Post procedure instructions: It was reviewed that the Mirena will not alter the timing of when she bleeds but it may decrease the quantity of flow and cramps.  Roughly 30% of people will be amenorrheic over time.  Efficacy rate of 99.2% over 6 years was discussed.  Spontaneous expulsion rate of 1-2% was also discussed.  If she has any issue with fever or excessive bleeding or pain she is to call the office immediately.  Otherwise I would like to see her back in 4 weeks with an ultrasound to confirm correct placement.  Patient's Pap smear showed low-grade lesion and cervical biopsy confirmed mild dysplasia.  I discussion about management of abnormal Pap  smears with IUD in place was initiated.  Patient needs the IUD for menorrhagia.    This note was electronically signed.    Christophe Lombardo MD    November 16, 2020

## 2020-11-23 PROCEDURE — U0004 COV-19 TEST NON-CDC HGH THRU: HCPCS | Performed by: NURSE PRACTITIONER

## 2020-12-16 ENCOUNTER — HOSPITAL ENCOUNTER (EMERGENCY)
Facility: HOSPITAL | Age: 45
Discharge: LEFT BEFORE DISCHARGE | End: 2020-12-16

## 2020-12-16 VITALS
BODY MASS INDEX: 22.58 KG/M2 | HEART RATE: 61 BPM | WEIGHT: 149 LBS | OXYGEN SATURATION: 97 % | DIASTOLIC BLOOD PRESSURE: 71 MMHG | TEMPERATURE: 97.5 F | RESPIRATION RATE: 20 BRPM | SYSTOLIC BLOOD PRESSURE: 108 MMHG | HEIGHT: 68 IN

## 2021-06-10 ENCOUNTER — OFFICE VISIT (OUTPATIENT)
Dept: OBSTETRICS AND GYNECOLOGY | Facility: CLINIC | Age: 46
End: 2021-06-10

## 2021-06-10 VITALS
HEIGHT: 68 IN | DIASTOLIC BLOOD PRESSURE: 60 MMHG | BODY MASS INDEX: 22.88 KG/M2 | SYSTOLIC BLOOD PRESSURE: 100 MMHG | RESPIRATION RATE: 14 BRPM | WEIGHT: 151 LBS

## 2021-06-10 DIAGNOSIS — Z30.431 IUD CHECK UP: ICD-10-CM

## 2021-06-10 DIAGNOSIS — N89.8 VAGINAL WALL CYST: Primary | ICD-10-CM

## 2021-06-10 DIAGNOSIS — R10.2 PELVIC PAIN: Primary | ICD-10-CM

## 2021-06-10 PROCEDURE — 99213 OFFICE O/P EST LOW 20 MIN: CPT | Performed by: OBSTETRICS & GYNECOLOGY

## 2021-06-10 RX ORDER — CEFUROXIME AXETIL 500 MG/1
500 TABLET ORAL 2 TIMES DAILY
Qty: 20 TABLET | Refills: 0 | Status: SHIPPED | OUTPATIENT
Start: 2021-06-10 | End: 2022-06-16

## 2021-06-10 RX ORDER — NAPROXEN SODIUM 220 MG
220 TABLET ORAL 2 TIMES DAILY PRN
COMMUNITY
End: 2023-01-24

## 2021-06-10 NOTE — PROGRESS NOTES
Subjective   Jeane Escalante is a 46 y.o. female is here today as a self referral.    Chief Complaint   Patient presents with   • Follow-up     Patient complains of a painful solid lesion inside the vagina on the right side. Patient noticed the lesion over the weekend.        History of Present Illness  Patient noticed a small cyst on the right side of the vagina at the 10 o'clock position of the hymeneal ring on 6/7/2021.  She presents today to have this inspected and to come up with a treatment plan  The following portions of the patient's history were reviewed and updated as appropriate: allergies, current medications, past family history, past medical history, past social history, past surgical history and problem list.    Review of Systems - Negative except for present    Objective   General:  well developed; well nourished  no acute distress   Skin:  Not performed.   Thyroid: not examined   Breasts:  Not performed.   Abdomen: Not performed.   Heart: regular rate and rhythm, S1, S2 normal, no murmur, click, rub or gallop   Lungs: clear to auscultation   Pelvis: Clinical staff was present for exam  External genitalia:  Small circular cyst at the 10 o'clock position of the vagina external to the hymeneal ring.  Slightly tender and small area of induration  :  urethral meatus normal;  Vaginal:  normal pink mucosa without prolapse or lesions.  Cervix:  normal appearance. IUD string present - 1.5 cms in length;  Uterus:  normal size, shape and consistency. anteverted;  Adnexa:  normal bimanual exam of the adnexa.  Rectal:  digital rectal exam not performed; anus visually normal appearing.         Assessment/Plan   Diagnoses and all orders for this visit:    1. Vaginal wall cyst (Primary)    2. IUD check up  -     cefuroxime (CEFTIN) 500 MG tablet; Take 1 tablet by mouth 2 (Two) Times a Day.  Dispense: 20 tablet; Refill: 0      Return in 1 week    Christophe Lombardo MD

## 2021-08-05 ENCOUNTER — HOSPITAL ENCOUNTER (EMERGENCY)
Facility: HOSPITAL | Age: 46
Discharge: LEFT BEFORE DISCHARGE | End: 2021-08-05

## 2021-08-05 VITALS
SYSTOLIC BLOOD PRESSURE: 124 MMHG | OXYGEN SATURATION: 97 % | HEIGHT: 68 IN | BODY MASS INDEX: 23.95 KG/M2 | RESPIRATION RATE: 18 BRPM | HEART RATE: 69 BPM | TEMPERATURE: 98.7 F | WEIGHT: 158 LBS | DIASTOLIC BLOOD PRESSURE: 87 MMHG

## 2021-08-05 PROCEDURE — 99211 OFF/OP EST MAY X REQ PHY/QHP: CPT

## 2021-08-06 ENCOUNTER — HOSPITAL ENCOUNTER (EMERGENCY)
Facility: HOSPITAL | Age: 46
Discharge: HOME | End: 2021-08-06
Attending: EMERGENCY MEDICINE | Admitting: EMERGENCY MEDICINE

## 2021-08-06 VITALS
RESPIRATION RATE: 18 BRPM | HEART RATE: 82 BPM | OXYGEN SATURATION: 98 % | DIASTOLIC BLOOD PRESSURE: 83 MMHG | TEMPERATURE: 98.1 F | SYSTOLIC BLOOD PRESSURE: 127 MMHG | HEIGHT: 68 IN | BODY MASS INDEX: 23.79 KG/M2 | WEIGHT: 157 LBS

## 2021-08-06 DIAGNOSIS — M54.12 CERVICAL RADICULOPATHY AT C6: Primary | ICD-10-CM

## 2021-08-06 PROCEDURE — 99282 EMERGENCY DEPT VISIT SF MDM: CPT

## 2021-08-06 RX ORDER — LIDOCAINE 50 MG/G
1 PATCH TOPICAL EVERY 24 HOURS
Qty: 30 PATCH | Refills: 0 | Status: SHIPPED | OUTPATIENT
Start: 2021-08-06 | End: 2023-01-24

## 2021-08-06 RX ORDER — CYCLOBENZAPRINE HCL 10 MG
10 TABLET ORAL 3 TIMES DAILY PRN
Qty: 12 TABLET | Refills: 0 | Status: SHIPPED | OUTPATIENT
Start: 2021-08-06 | End: 2023-01-24

## 2021-08-06 NOTE — ED PROVIDER NOTES
Subjective   46-year-old female comes emerged from today with a 24-hour history of severe neck pain.  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  She reports the pain became so severe that she was unable to stay at work and she went to [facility] and had a CT scan of her neck.  She reports that they told her that she had a piece of arthritis pushing on her nerve and actually brought the report.  She was given 15 Lortab there started on 50 mg of prednisone a day.  She was referred to a neurosurgeon but she is prefer to be referred to a neurosurgery group associated with [facility].  She denies any loss of function of the upper or lower extremities.  No numbness or tingling.  Just severe pain pain is worse with movement of her head.      History provided by:  Patient   used: No    Neck Pain  Pain location:  L side  Quality:  Burning, aching and shooting  Pain radiates to:  L shoulder  Pain severity:  Severe  Onset quality:  Sudden  Duration:  1 day  Timing:  Constant  Progression:  Unchanged  Chronicity:  New  Context: not fall, not jumping from heights, not lifting a heavy object, not MCA, not MVC, not pedestrian accident and not recent injury    Relieved by:  Position and analgesics  Worsened by:  Position  Associated symptoms: no bladder incontinence, no bowel incontinence, no chest pain, no fever, no headaches, no numbness, no photophobia, no syncope, no visual change and no weight loss    Risk factors: no hx of head and neck radiation, no hx of osteoporosis, no hx of spinal trauma, no recent epidural and no recurrent falls        Review of Systems   Constitutional: Negative for chills, fever and weight loss.   Eyes: Negative for photophobia.   Respiratory: Negative for chest tightness, shortness of breath and wheezing.    Cardiovascular: Negative for chest  pain, palpitations and syncope.   Gastrointestinal: Negative for abdominal pain, bowel incontinence, diarrhea, nausea and vomiting.   Genitourinary: Negative for bladder incontinence.   Musculoskeletal: Positive for neck pain.   Neurological: Negative for numbness and headaches.   Psychiatric/Behavioral: Negative.    All other systems reviewed and are negative.      Past Medical History:   Diagnosis Date   • Anxiety     h/o   • Depression     h/o   • Menorrhagia with irregular cycle        Allergies   Allergen Reactions   • Adhesive Tape Hives   • Sudafed [Pseudoephedrine] Rash   • Sulfa Antibiotics Rash   • Zithromax [Azithromycin] Dizziness       Past Surgical History:   Procedure Laterality Date   • D & C HYSTEROSCOPY  2020    with mole removal from inner left thigh.       Family History   Problem Relation Age of Onset   • Obesity Maternal Grandmother         poss colon cancer   • Heart disease Paternal Grandfather    • Ovarian cysts Sister         s/p hysterectomy   • Breast cancer Paternal Aunt 40   • Other Paternal Aunt          rare disease 60's   • Osteoporosis Neg Hx        Social History     Socioeconomic History   • Marital status:      Spouse name: Not on file   • Number of children: Not on file   • Years of education: Not on file   • Highest education level: Not on file   Tobacco Use   • Smoking status: Current Every Day Smoker     Packs/day: 1.00     Years: 20.00     Pack years: 20.00   • Smokeless tobacco: Never Used   Substance and Sexual Activity   • Alcohol use: Yes     Comment: OCCASIONALLY   • Drug use: No   • Sexual activity: Yes     Partners: Male     Comment: Boyfriend vasectomy           Objective   Physical Exam  Vitals and nursing note reviewed.   Constitutional:       Appearance: She is well-developed.   HENT:      Head: Normocephalic and atraumatic.      Right Ear: External ear normal.      Left Ear: External ear normal.      Nose: Nose normal.   Eyes:      General: No  scleral icterus.     Conjunctiva/sclera: Conjunctivae normal.      Pupils: Pupils are equal, round, and reactive to light.   Neck:      Thyroid: No thyromegaly.      Comments: Tenderness to palpation of the left trapezius and into the occiput.  There is no rash no lesions.  There is no ecchymosis.  Muscle is tense and spasm  Cardiovascular:      Rate and Rhythm: Normal rate and regular rhythm.      Heart sounds: Normal heart sounds.   Pulmonary:      Effort: Pulmonary effort is normal. No respiratory distress.      Breath sounds: Normal breath sounds. No wheezing or rales.   Chest:      Chest wall: No tenderness.   Abdominal:      General: Bowel sounds are normal. There is no distension.      Palpations: Abdomen is soft.      Tenderness: There is no abdominal tenderness.   Musculoskeletal:         General: Normal range of motion.   Lymphadenopathy:      Cervical: No cervical adenopathy.   Skin:     General: Skin is warm and dry.   Neurological:      Mental Status: She is alert and oriented to person, place, and time.      Cranial Nerves: No cranial nerve deficit.      Coordination: Coordination normal.      Deep Tendon Reflexes: Reflexes are normal and symmetric. Reflexes normal.   Psychiatric:         Behavior: Behavior normal.         Thought Content: Thought content normal.         Judgment: Judgment normal.         Procedures           ED Course                                 CT scan department that does reveal C5-C6 osteophyte compressing the nerve.        MDM  Number of Diagnoses or Management Options  Cervical radiculopathy at C6: new and requires workup     Amount and/or Complexity of Data Reviewed  Review and summarize past medical records: yes  Discuss the patient with other providers: yes    Patient Progress  Patient progress: stable      Final diagnoses:   Cervical radiculopathy at C6       ED Disposition  ED Disposition     ED Disposition Condition Comment    Discharge Stable           Rodrigo Bonds  MD YOEL  [address] [facility] [address]  [place] [id number]  [phone]               Medication List      New Prescriptions    cyclobenzaprine 10 MG tablet  Commonly known as: FLEXERIL  Take 1 tablet by mouth 3 (Three) Times a Day As Needed for Muscle Spasms.     lidocaine 5 %  Commonly known as: LIDODERM  Place 1 patch on the skin as directed by provider Daily. Remove & Discard patch within 12 hours or as directed by MD           Where to Get Your Medications      These medications were sent to [id number] - [address] AT [facility] & [address] [facility] [phone] [facility] [phone] [place] [id number]    Phone: [phone]   · cyclobenzaprine 10 MG tablet  · lidocaine 5 %          Adolph Vanessa PA  08/09/21 [id number]

## 2021-10-09 NOTE — PROGRESS NOTES
Pt to ED via EMS with complaints of \"shakiness\". Reports she ran out of her seizure medications 3 days ago. Hx ETOH abuse.    Subjective   Jeane Escalante is a 41 y.o. female.     HPI Comments: 2 weeks duration of cough, after flu diagnosis here. Congestion. Using Mucinex and Robutussin.     Cough   Associated symptoms include shortness of breath and wheezing. Pertinent negatives include no chills or fever.        The following portions of the patient's history were reviewed and updated as appropriate: allergies, current medications, past family history, past medical history, past social history and past surgical history.    Review of Systems   Constitutional: Positive for appetite change and fatigue. Negative for chills and fever.   HENT: Positive for congestion.    Respiratory: Positive for cough, shortness of breath and wheezing.        Objective   Physical Exam   Constitutional: She appears well-developed.   HENT:   Head: Normocephalic.   Right Ear: Tympanic membrane normal.   Left Ear: Tympanic membrane normal.   Nose: Mucosal edema present.   Mouth/Throat: Oropharynx is clear and moist. No posterior oropharyngeal erythema.   Eyes: Pupils are equal, round, and reactive to light.   Cardiovascular: Normal rate and regular rhythm.    Pulmonary/Chest: Effort normal and breath sounds normal.       Assessment/Plan   Jeane was seen today for cough.    Diagnoses and all orders for this visit:    Bronchitis    Tobacco abuse    Other orders  -     cefdinir (OMNICEF) 300 MG capsule; Take 1 capsule by mouth 2 (Two) Times a Day for 10 days.  -     PredniSONE (DELTASONE) 10 MG (21) tablet pack; Take  by mouth Daily for 6 days.  -     promethazine-dextromethorphan (PROMETHAZINE-DM) 6.25-15 MG/5ML syrup; Take 5 mL by mouth 2 (Two) Times a Day for 7 days.

## 2022-04-27 ENCOUNTER — OFFICE VISIT (OUTPATIENT)
Dept: OBSTETRICS AND GYNECOLOGY | Facility: CLINIC | Age: 47
End: 2022-04-27

## 2022-04-27 VITALS
SYSTOLIC BLOOD PRESSURE: 112 MMHG | WEIGHT: 156.4 LBS | RESPIRATION RATE: 14 BRPM | BODY MASS INDEX: 23.7 KG/M2 | DIASTOLIC BLOOD PRESSURE: 64 MMHG | HEIGHT: 68 IN

## 2022-04-27 DIAGNOSIS — N76.0 VAGINAL INFECTION: ICD-10-CM

## 2022-04-27 DIAGNOSIS — L73.9 FOLLICULITIS: ICD-10-CM

## 2022-04-27 DIAGNOSIS — R30.0 DYSURIA: Primary | ICD-10-CM

## 2022-04-27 PROCEDURE — 87086 URINE CULTURE/COLONY COUNT: CPT | Performed by: STUDENT IN AN ORGANIZED HEALTH CARE EDUCATION/TRAINING PROGRAM

## 2022-04-27 PROCEDURE — 81001 URINALYSIS AUTO W/SCOPE: CPT | Performed by: STUDENT IN AN ORGANIZED HEALTH CARE EDUCATION/TRAINING PROGRAM

## 2022-04-27 PROCEDURE — 99213 OFFICE O/P EST LOW 20 MIN: CPT | Performed by: STUDENT IN AN ORGANIZED HEALTH CARE EDUCATION/TRAINING PROGRAM

## 2022-04-27 RX ORDER — LEVONORGESTREL 52 MG/1
1 INTRAUTERINE DEVICE INTRAUTERINE
COMMUNITY
End: 2023-01-23

## 2022-04-27 RX ORDER — FLUCONAZOLE 150 MG/1
150 TABLET ORAL ONCE
Qty: 1 TABLET | Refills: 0 | Status: SHIPPED | OUTPATIENT
Start: 2022-04-27 | End: 2022-04-27

## 2022-04-27 NOTE — PROGRESS NOTES
"Subjective   Chief Complaint   Patient presents with   • Follow-up     Purplish bumps on vagina, unable to empty bladder all the way.      Jeane Escalante is a 47 y.o. year old .  No LMP recorded. Patient has had an implant.  She reports noticing small purple bumps on the outside of her vagina that she first noticed about 2 days ago. She was about to shave and then noticed the bumps on both sides, near the top lining area. Denies bleeding, or itching. She does however notice a slight odor, but denies vaginal discharge. Endorses remote history of genital warts, and denies any recent outbreak. She does not believe these bumps look similar to her previous warts. Endorses dysuria and inability to fully empty her bladder (of note patient has baseline hematuria since she was a child).     OTHER THINGS SHE WANTS TO DISCUSS TODAY:  Nothing else    The following portions of the patient's history were reviewed and updated as appropriate:current medications, allergies and past medical history    Social History    Tobacco Use      Smoking status: Current Every Day Smoker        Packs/day: 1.00        Years: 20.00        Pack years: 20      Smokeless tobacco: Never Used         Objective   /64 (BP Location: Right arm, Patient Position: Sitting, Cuff Size: Adult)   Resp 14   Ht 172.7 cm (68\")   Wt 70.9 kg (156 lb 6.4 oz)   Breastfeeding No   BMI 23.78 kg/m²     General:  well developed; well nourished  no acute distress   Lungs:  breathing is unlabored   Heart:  Not performed.   Pelvis: Clinical staff was present for exam  External genitalia:  normal appearance of the external genitalia including Bartholin's and Plentywood's glands. small area on left labia majora consistent with mild folliculitis   :  urethral meatus normal;  Vaginal:  normal pink mucosa without prolapse or lesions. discharge present -  white;  Cervix:  normal appearance.     Lab Review   No data reviewed    Imaging   No data reviewed      "   Assessment   1. Labial folliculitis   2. Dysuria   3. Vaginal discharge      Plan   1. Exam consistent with folliculitis. Recommend warm compress and washing with soap and water. Aquaphor at night to act as barrier cream  2. Will prophylacticly treat for yeast infection with one time dose of diflucan. Leukorrhea swab collected.  Will call patient with any abnormal results and treat accordingly.  3. UA with reflex culture ordered for dysuria.  Will call patient with any abnormal results.  4. The importance of keeping all planned follow-up and taking all medications as prescribed was emphasized.  5. Follow up for annual exam     New Medications Ordered This Visit   Medications   • fluconazole (Diflucan) 150 MG tablet     Sig: Take 1 tablet by mouth 1 (One) Time for 1 dose.     Dispense:  1 tablet     Refill:  0          This note was electronically signed.    Esthela Jay MD   April 27, 2022

## 2022-04-28 LAB
BACTERIA UR QL AUTO: ABNORMAL /HPF
BILIRUB UR QL STRIP: NEGATIVE
CLARITY UR: ABNORMAL
COLOR UR: YELLOW
GLUCOSE UR STRIP-MCNC: NEGATIVE MG/DL
HGB UR QL STRIP.AUTO: ABNORMAL
HYALINE CASTS UR QL AUTO: ABNORMAL /LPF
KETONES UR QL STRIP: NEGATIVE
LEUKOCYTE ESTERASE UR QL STRIP.AUTO: ABNORMAL
NITRITE UR QL STRIP: NEGATIVE
PH UR STRIP.AUTO: 6.5 [PH] (ref 5–8)
PROT UR QL STRIP: NEGATIVE
RBC # UR STRIP: ABNORMAL /HPF
REF LAB TEST METHOD: ABNORMAL
SP GR UR STRIP: <1.005 (ref 1–1.03)
SQUAMOUS #/AREA URNS HPF: ABNORMAL /HPF
UROBILINOGEN UR QL STRIP: ABNORMAL
WBC # UR STRIP: ABNORMAL /HPF

## 2022-04-29 LAB — BACTERIA SPEC AEROBE CULT: NO GROWTH

## 2022-05-02 ENCOUNTER — TELEPHONE (OUTPATIENT)
Dept: OBSTETRICS AND GYNECOLOGY | Facility: CLINIC | Age: 47
End: 2022-05-02

## 2022-05-02 NOTE — TELEPHONE ENCOUNTER
Pt called back following up on phone call regarding labs. Please contact back. If she does not answer she states to leave a message

## 2022-05-08 RX ORDER — METRONIDAZOLE 500 MG/1
500 TABLET ORAL 2 TIMES DAILY
Qty: 14 TABLET | Refills: 0 | Status: SHIPPED | OUTPATIENT
Start: 2022-05-08 | End: 2022-05-15

## 2022-06-16 ENCOUNTER — OFFICE VISIT (OUTPATIENT)
Dept: OBSTETRICS AND GYNECOLOGY | Facility: CLINIC | Age: 47
End: 2022-06-16

## 2022-06-16 VITALS
SYSTOLIC BLOOD PRESSURE: 110 MMHG | HEIGHT: 68 IN | DIASTOLIC BLOOD PRESSURE: 72 MMHG | BODY MASS INDEX: 23.43 KG/M2 | WEIGHT: 154.6 LBS | RESPIRATION RATE: 14 BRPM

## 2022-06-16 DIAGNOSIS — Z20.2 EXPOSURE TO STD: Primary | ICD-10-CM

## 2022-06-16 DIAGNOSIS — R10.2 PELVIC PAIN: ICD-10-CM

## 2022-06-16 PROCEDURE — 99212 OFFICE O/P EST SF 10 MIN: CPT | Performed by: STUDENT IN AN ORGANIZED HEALTH CARE EDUCATION/TRAINING PROGRAM

## 2022-06-16 NOTE — PROGRESS NOTES
"Subjective   Chief Complaint   Patient presents with   • Follow-up     JANETTE for Trich c/o lower abdominal cramping for several months now.        Jeane Escalante is a 47 y.o. year old .  No LMP recorded. Patient has had an implant.  She presents for a test of cure for recent trichomonas diagnosis on 2022.  She reports compliance with the course of Flagyl.  She and her partner  after the diagnosis and she is unsure if they have been treated, however she has not had intercourse since diagnosis.    She has also been having lower abdominal pain for the last few months that is on both sides of her lower abdomen that will wrap around to her back. It is intermittent throughout the month, and deneis any vaginal bleeding. She cannot identify any alleviated or aggrevating factors. She will take Aleve with some relief. She reports increased BM, and denies issues with constipation. She has been walking more and is unsure if hat has anyhting to do with it.     The following portions of the patient's history were reviewed and updated as appropriate:current medications and allergies    Social History    Tobacco Use      Smoking status: Current Every Day Smoker        Packs/day: 1.00        Years: 20.00        Pack years: 20      Smokeless tobacco: Never Used         Objective   /72 (BP Location: Right arm, Patient Position: Sitting, Cuff Size: Adult)   Resp 14   Ht 172.7 cm (68\")   Wt 70.1 kg (154 lb 9.6 oz)   Breastfeeding No   BMI 23.51 kg/m²     Lab Review   No data reviewed    Imaging   No data reviewed        Assessment   1. Trichomonas test of cure   2. Pelvic pain      Plan   1. STD swab collected for trichomonas test of cure.  Will notify patient of results.   2. TVUS ordered, IUD in correct position without any abnormal pelvic pathology. Pain most likely not GYN in origin.   3. The importance of keeping all planned follow-up and taking all medications as prescribed was " emphasized.  4. Follow up for annual exam in August of this year    No orders of the defined types were placed in this encounter.         This note was electronically signed.    Esthela Jay MD   June 20, 2022

## 2022-06-20 RX ORDER — METRONIDAZOLE 500 MG/1
500 TABLET ORAL 2 TIMES DAILY
Qty: 14 TABLET | Refills: 0 | Status: SHIPPED | OUTPATIENT
Start: 2022-06-20 | End: 2022-06-27

## 2022-08-05 ENCOUNTER — OFFICE VISIT (OUTPATIENT)
Dept: OBSTETRICS AND GYNECOLOGY | Facility: CLINIC | Age: 47
End: 2022-08-05

## 2022-08-05 VITALS — DIASTOLIC BLOOD PRESSURE: 64 MMHG | WEIGHT: 153.4 LBS | BODY MASS INDEX: 23.32 KG/M2 | SYSTOLIC BLOOD PRESSURE: 100 MMHG

## 2022-08-05 DIAGNOSIS — N39.0 URINARY TRACT INFECTION WITHOUT HEMATURIA, SITE UNSPECIFIED: ICD-10-CM

## 2022-08-05 DIAGNOSIS — Z01.419 WOMEN'S ANNUAL ROUTINE GYNECOLOGICAL EXAMINATION: ICD-10-CM

## 2022-08-05 DIAGNOSIS — N81.4 CYSTOCELE WITH PROLAPSE: ICD-10-CM

## 2022-08-05 DIAGNOSIS — R33.9 INCOMPLETE BLADDER EMPTYING: ICD-10-CM

## 2022-08-05 DIAGNOSIS — Z12.31 ENCOUNTER FOR SCREENING MAMMOGRAM FOR BREAST CANCER: ICD-10-CM

## 2022-08-05 DIAGNOSIS — R87.610 ASCUS WITH POSITIVE HIGH RISK HPV CERVICAL: Primary | ICD-10-CM

## 2022-08-05 DIAGNOSIS — R87.810 ASCUS WITH POSITIVE HIGH RISK HPV CERVICAL: Primary | ICD-10-CM

## 2022-08-05 DIAGNOSIS — N39.3 SUI (STRESS URINARY INCONTINENCE, FEMALE): ICD-10-CM

## 2022-08-05 LAB
BACTERIA UR QL AUTO: ABNORMAL /HPF
BILIRUB UR QL STRIP: NEGATIVE
CLARITY UR: CLEAR
COLOR UR: YELLOW
GLUCOSE UR STRIP-MCNC: NEGATIVE MG/DL
HGB UR QL STRIP.AUTO: ABNORMAL
HYALINE CASTS UR QL AUTO: ABNORMAL /LPF
KETONES UR QL STRIP: NEGATIVE
LEUKOCYTE ESTERASE UR QL STRIP.AUTO: ABNORMAL
NITRITE UR QL STRIP: NEGATIVE
PH UR STRIP.AUTO: 7.5 [PH] (ref 5–8)
PROT UR QL STRIP: NEGATIVE
RBC # UR STRIP: ABNORMAL /HPF
REF LAB TEST METHOD: ABNORMAL
SP GR UR STRIP: <1.005 (ref 1–1.03)
SQUAMOUS #/AREA URNS HPF: ABNORMAL /HPF
UROBILINOGEN UR QL STRIP: ABNORMAL
WBC # UR STRIP: ABNORMAL /HPF

## 2022-08-05 PROCEDURE — 99396 PREV VISIT EST AGE 40-64: CPT | Performed by: STUDENT IN AN ORGANIZED HEALTH CARE EDUCATION/TRAINING PROGRAM

## 2022-08-05 PROCEDURE — 87086 URINE CULTURE/COLONY COUNT: CPT | Performed by: STUDENT IN AN ORGANIZED HEALTH CARE EDUCATION/TRAINING PROGRAM

## 2022-08-05 PROCEDURE — 81001 URINALYSIS AUTO W/SCOPE: CPT | Performed by: STUDENT IN AN ORGANIZED HEALTH CARE EDUCATION/TRAINING PROGRAM

## 2022-08-05 PROCEDURE — 2014F MENTAL STATUS ASSESS: CPT | Performed by: STUDENT IN AN ORGANIZED HEALTH CARE EDUCATION/TRAINING PROGRAM

## 2022-08-05 PROCEDURE — 87077 CULTURE AEROBIC IDENTIFY: CPT | Performed by: STUDENT IN AN ORGANIZED HEALTH CARE EDUCATION/TRAINING PROGRAM

## 2022-08-05 PROCEDURE — 3008F BODY MASS INDEX DOCD: CPT | Performed by: STUDENT IN AN ORGANIZED HEALTH CARE EDUCATION/TRAINING PROGRAM

## 2022-08-05 PROCEDURE — 87186 SC STD MICRODIL/AGAR DIL: CPT | Performed by: STUDENT IN AN ORGANIZED HEALTH CARE EDUCATION/TRAINING PROGRAM

## 2022-08-05 RX ORDER — AMOXICILLIN 500 MG/1
500 CAPSULE ORAL EVERY 8 HOURS
COMMUNITY
Start: 2022-05-11 | End: 2023-01-24

## 2022-08-05 NOTE — PROGRESS NOTES
"Subjective   Chief Complaint   Patient presents with   • Annual Exam     Having issues with emptying her bladder     Jeane Escalante is a 47 y.o. year old  presenting to be seen for her annual exam. She reports issues with emptying her bladder. She reports drinking a lot of water throughout the day, but feels like she can start a stream of urine, but toward the end \"feels like she's not done\" and it hurts like its \"stopped up.\" She will still feel the urge to urinate but cannot. However a few minutes later she will have to go back and urinate again. She has not had to splint to urinate or have a BM. She has a known history since childhood of hematuria, but does not appreciate any hematuria currently. She has BM irregularly and feels very gassy. She endorses a long standing history of non bothersome NELIA. Denies OAB symptoms. She also continues to have small puple bumps on her vulva.     Mammogram: due in October at [facility]   Colonoscopy: not yet completed, but will contact PCP for scheduling     SEXUAL Hx:  She is currently sexually active.  In the past year there there has been ONE new sexual partner.    She would not like to be screened for STD's at today's exam.  Current birth control method: IUD - Mirena. Placed 20.   She is happy with her current method of contraception and does not want to discuss alternative methods of contraception.  MENSTRUAL Hx:  No LMP recorded. Patient has had an implant.  In the past 6 months her cycles have been absent.    Intermenstrual bleeding is absent.    Post-coital bleeding is n/a.  Dysmenorrhea: none  PMS: none  Her cycles are not a source of concern for her that she wishes to discuss today.    The following portions of the patient's history were reviewed and updated as appropriate:problem list, current medications, allergies, past family history, past medical history, past social history and past surgical history.    Social History    Tobacco Use      " Smoking status: Current Every Day Smoker        Packs/day: 1.00        Years: 20.00        Pack years: 20      Smokeless tobacco: Never Used         Objective   /64 (BP Location: Right arm, Patient Position: Sitting, Cuff Size: Adult)   Wt 69.6 kg (153 lb 6.4 oz)   Breastfeeding No   BMI 23.32 kg/m²     General:  well developed; well nourished  no acute distress   Skin:  No suspicious lesions seen   Breasts:  Examined in supine position  Symmetric without masses or skin dimpling  Nipples normal without inversion, lesions or discharge  There are no palpable axillary nodes   Pelvis: Clinical staff was present for exam  External genitalia:  normal appearance of the external genitalia including Bartholin's and Minor's glands; 2 small, <1cm areas of folliculitis noted   :  urethral meatus normal;  Vaginal:  normal pink mucosa without prolapse or lesions.  Cervix:  normal appearance. IUD string present - 3 cms in length;  Uterus:  normal size, shape and consistency.  Adnexa:  normal bimanual exam of the adnexa.  Rectal:  digital rectal exam not performed; anus visually normal appearing.   Cystocele grade 1-2  Rectocele grade 1        Assessment   1. Normal GYN exam   2. Incomplete bladder emptying   3. NELIA  4. Cystocele with rectocele   5. IUD in place      Plan   1. Pap and HPV was done today for previous abnormal pap smear with CIN1 on colpo biopsy.  If she does not receive the results of the Pap within 2 weeks  time, she was instructed to call to find out the results.   2. She was encouraged to get yearly mammograms, order placed today.  She should report any palpable breast lump(s) or skin changes regardless of mammographic findings.  I explained to Jeane that notification regarding her mammogram results will come from the center performing the study.  Our office will not be routinely calling with mammogram results.  It is her responsibility to make sure that the results from the mammogram are communicated  to her by the breast center.  If she has any questions about the results, she is welcome to call our office anytime.  3. UA with reflex culture ordered   4. Referral to Urogynecology and Pelvic Floor Physical Therapy for cystocele with incomplete bladder emptying   5. IUD strings visualized, reassurance provided.  Recommend leaving in place until 2027.   6. The importance of keeping all planned follow-up and taking all medications as prescribed was emphasized.  7. Follow up for annual exam in 1 year or sooner PRN     No orders of the defined types were placed in this encounter.         This note was electronically signed.    Esthela Jay MD   August 5, 2022

## 2022-08-08 LAB — BACTERIA SPEC AEROBE CULT: ABNORMAL

## 2022-08-09 ENCOUNTER — TELEPHONE (OUTPATIENT)
Dept: OBSTETRICS AND GYNECOLOGY | Facility: CLINIC | Age: 47
End: 2022-08-09

## 2022-08-09 RX ORDER — NITROFURANTOIN 25; 75 MG/1; MG/1
100 CAPSULE ORAL 2 TIMES DAILY
Qty: 14 CAPSULE | Refills: 0 | Status: SHIPPED | OUTPATIENT
Start: 2022-08-09 | End: 2022-08-16

## 2022-08-09 NOTE — TELEPHONE ENCOUNTER
Patient called, stating the hours at PT in [facility] will not work for her and wants to know if she could be referred elsewhere in [place]. Patient states she has not heard back with results from her urine culture and would like an update. Please give patient a call

## 2022-08-11 LAB — REF LAB TEST METHOD: NORMAL

## 2022-08-12 ENCOUNTER — TELEPHONE (OUTPATIENT)
Dept: OBSTETRICS AND GYNECOLOGY | Facility: CLINIC | Age: 47
End: 2022-08-12

## 2022-08-12 NOTE — TELEPHONE ENCOUNTER
Caller: Jeane Escalante    Relationship to patient: Self    Best call back number: [phone]-CALL ANYTIME, PT REQUESTED A DETAILED VOICEMAIL.     Patient is needing: IF POSSIBLE, PT WOULD LIKE TO CHANGE HER COLPOSCOPY APPT ON 09.02.22 @9 AM TO A LATER TIME ON THE SAME DAY-AROUND 4PM. PT ALSO HAS PROCEDURE RELATED QUESTIONS.     UNABLE TO W/T

## 2022-09-15 NOTE — PROGRESS NOTES
Colposcopy of cervix    Date of procedure:  9/16/2022   Risks and benefits discussed? yes   All questions answered? yes   Consents given by: patient   Written consent obtained? yes   Pre-op indication: LGSIL, HPV positive (non 16/18)          Procedure documentation:  The cervix was initially viewed colposcopically through a green filter.  The cervix was next bathed in acetic acid.   The findings were as follows:    Transformation zone seen? Yes   Findings: 1. Acetowhite noted at 3 o'clock and 9 o'clock  2. Mosaicism noted at 3 o'clock and 9 o'clock   Ectocervical biopsies: taken from 3 o'clock and 9 o'clock.  Monsels solution was applied to the biopsy sites.   Endocervical curettage: performed               Colposcopic Impression: 1. Moderate dysplasia  2. Adequate colposcopy  3. Colposcopic findings are consistent with cytology       Plan: Will base further treatment on pathology results  Post biopsy instructions given to patient.  Specimens labelled and sent to pathology.       Of note she also complains of increased urinary frequency (UTI last month with E. Coli treated with Macrobid), UA collected.   This note was electronically signed.    Esthela Jay MD   September 16, 2022

## 2022-09-16 ENCOUNTER — OFFICE VISIT (OUTPATIENT)
Dept: OBSTETRICS AND GYNECOLOGY | Facility: CLINIC | Age: 47
End: 2022-09-16

## 2022-09-16 VITALS
DIASTOLIC BLOOD PRESSURE: 78 MMHG | WEIGHT: 150.3 LBS | BODY MASS INDEX: 22.78 KG/M2 | HEIGHT: 68 IN | RESPIRATION RATE: 14 BRPM | SYSTOLIC BLOOD PRESSURE: 102 MMHG

## 2022-09-16 DIAGNOSIS — Z87.440 HISTORY OF UTI: ICD-10-CM

## 2022-09-16 DIAGNOSIS — R87.612 LGSIL ON PAP SMEAR OF CERVIX: Primary | ICD-10-CM

## 2022-09-16 PROCEDURE — 81001 URINALYSIS AUTO W/SCOPE: CPT | Performed by: STUDENT IN AN ORGANIZED HEALTH CARE EDUCATION/TRAINING PROGRAM

## 2022-09-16 PROCEDURE — 57454 BX/CURETT OF CERVIX W/SCOPE: CPT | Performed by: STUDENT IN AN ORGANIZED HEALTH CARE EDUCATION/TRAINING PROGRAM

## 2022-09-16 PROCEDURE — 87086 URINE CULTURE/COLONY COUNT: CPT | Performed by: STUDENT IN AN ORGANIZED HEALTH CARE EDUCATION/TRAINING PROGRAM

## 2022-09-17 LAB
BACTERIA SPEC AEROBE CULT: NO GROWTH
BACTERIA UR QL AUTO: ABNORMAL /HPF
BILIRUB UR QL STRIP: NEGATIVE
CLARITY UR: ABNORMAL
COLOR UR: YELLOW
GLUCOSE UR STRIP-MCNC: NEGATIVE MG/DL
HGB UR QL STRIP.AUTO: ABNORMAL
HYALINE CASTS UR QL AUTO: ABNORMAL /LPF
KETONES UR QL STRIP: NEGATIVE
LEUKOCYTE ESTERASE UR QL STRIP.AUTO: ABNORMAL
NITRITE UR QL STRIP: NEGATIVE
PH UR STRIP.AUTO: 6 [PH] (ref 5–8)
PROT UR QL STRIP: NEGATIVE
RBC # UR STRIP: ABNORMAL /HPF
REF LAB TEST METHOD: ABNORMAL
SP GR UR STRIP: 1.02 (ref 1–1.03)
SQUAMOUS #/AREA URNS HPF: ABNORMAL /HPF
UROBILINOGEN UR QL STRIP: ABNORMAL
WBC # UR STRIP: ABNORMAL /HPF
YEAST URNS QL MICRO: ABNORMAL /HPF

## 2022-09-19 LAB — REF LAB TEST METHOD: NORMAL

## 2022-09-21 RX ORDER — FLUCONAZOLE 150 MG/1
150 TABLET ORAL ONCE
Qty: 1 TABLET | Refills: 0 | Status: SHIPPED | OUTPATIENT
Start: 2022-09-21 | End: 2022-09-21

## 2022-10-12 ENCOUNTER — TELEPHONE (OUTPATIENT)
Dept: OBSTETRICS AND GYNECOLOGY | Facility: CLINIC | Age: 47
End: 2022-10-12

## 2022-10-12 NOTE — TELEPHONE ENCOUNTER
High pt called stating she is having a leep done as out pt this Friday and has some questions. Please contact back

## 2022-10-13 ENCOUNTER — PREP FOR SURGERY (OUTPATIENT)
Dept: OTHER | Facility: HOSPITAL | Age: 47
End: 2022-10-13

## 2022-10-13 NOTE — TELEPHONE ENCOUNTER
Patient has questions regarding her leep procedure.  She wants to make sure she could go back to work on Monday.  She went to her appointment regarding her prolapse.  She did not feel comfortable with the provider.  She felt ultrasound should she have had transvaginal ultrasound versus abdominal.  She doesn't want to do PT she doesn't have enough time due to work.  She is hurting really bad bladder pain and pelvic pain.  She would like it all removed.

## 2022-10-14 ENCOUNTER — LAB REQUISITION (OUTPATIENT)
Dept: LAB | Facility: HOSPITAL | Age: 47
End: 2022-10-14

## 2022-10-14 ENCOUNTER — OUTSIDE FACILITY SERVICE (OUTPATIENT)
Dept: OBSTETRICS AND GYNECOLOGY | Facility: CLINIC | Age: 47
End: 2022-10-14

## 2022-10-14 DIAGNOSIS — R87.612 LOW GRADE SQUAMOUS INTRAEPITHELIAL LESION ON CYTOLOGIC SMEAR OF CERVIX (LGSIL): ICD-10-CM

## 2022-10-14 PROCEDURE — 88305 TISSUE EXAM BY PATHOLOGIST: CPT | Performed by: STUDENT IN AN ORGANIZED HEALTH CARE EDUCATION/TRAINING PROGRAM

## 2022-10-14 PROCEDURE — 57522 CONIZATION OF CERVIX: CPT | Performed by: STUDENT IN AN ORGANIZED HEALTH CARE EDUCATION/TRAINING PROGRAM

## 2022-10-14 PROCEDURE — 88307 TISSUE EXAM BY PATHOLOGIST: CPT | Performed by: STUDENT IN AN ORGANIZED HEALTH CARE EDUCATION/TRAINING PROGRAM

## 2022-10-14 RX ORDER — IBUPROFEN 600 MG/1
600 TABLET ORAL EVERY 6 HOURS PRN
Qty: 60 TABLET | Refills: 1 | Status: SHIPPED | OUTPATIENT
Start: 2022-10-14 | End: 2023-01-24

## 2022-10-14 RX ORDER — DOCUSATE SODIUM 100 MG/1
100 CAPSULE, LIQUID FILLED ORAL 2 TIMES DAILY
Qty: 60 CAPSULE | Refills: 1 | Status: SHIPPED | OUTPATIENT
Start: 2022-10-14 | End: 2023-01-24

## 2022-10-17 LAB
CYTO UR: NORMAL
LAB AP CASE REPORT: NORMAL
LAB AP CLINICAL INFORMATION: NORMAL
PATH REPORT.FINAL DX SPEC: NORMAL
PATH REPORT.GROSS SPEC: NORMAL

## 2022-10-27 ENCOUNTER — OFFICE VISIT (OUTPATIENT)
Dept: OBSTETRICS AND GYNECOLOGY | Facility: CLINIC | Age: 47
End: 2022-10-27

## 2022-10-27 VITALS
SYSTOLIC BLOOD PRESSURE: 106 MMHG | BODY MASS INDEX: 22.49 KG/M2 | HEIGHT: 68 IN | WEIGHT: 148.4 LBS | RESPIRATION RATE: 14 BRPM | DIASTOLIC BLOOD PRESSURE: 62 MMHG

## 2022-10-27 DIAGNOSIS — R30.0 DYSURIA: ICD-10-CM

## 2022-10-27 DIAGNOSIS — Z98.890 S/P LEEP: Primary | ICD-10-CM

## 2022-10-27 PROCEDURE — 81001 URINALYSIS AUTO W/SCOPE: CPT | Performed by: STUDENT IN AN ORGANIZED HEALTH CARE EDUCATION/TRAINING PROGRAM

## 2022-10-27 PROCEDURE — 87086 URINE CULTURE/COLONY COUNT: CPT | Performed by: STUDENT IN AN ORGANIZED HEALTH CARE EDUCATION/TRAINING PROGRAM

## 2022-10-27 PROCEDURE — 99212 OFFICE O/P EST SF 10 MIN: CPT | Performed by: STUDENT IN AN ORGANIZED HEALTH CARE EDUCATION/TRAINING PROGRAM

## 2022-10-27 NOTE — PROGRESS NOTES
"Subjective   Chief Complaint   Patient presents with   • Post-op     s/p LEEP, had some mild to moderate cramping due to being on her feet all day long the Monday after surgery.     Jeane Escalante is a 47 y.o. year old  presenting to be seen for her post-operative visit.  Currently she reports no problems with eating, bowel movements, voiding, or  and pain is well controlled. She continues to have baseline dysuria and thinks she may have another UTI.     The results have previously been discussed with Jeane.    The following portions of the patient's history were reviewed and updated as appropriate:current medications and allergies       Objective   /62 (BP Location: Right arm, Patient Position: Sitting, Cuff Size: Adult)   Resp 14   Ht 172.7 cm (68\")   Wt 67.3 kg (148 lb 6.4 oz)   BMI 22.56 kg/m²     General:  well developed; well nourished  no acute distress   Abdomen: Not performed.     Final Diagnosis   1.  CERVIX, LEEP CONE EXCISION:  Low-grade squamous intraepithelial lesion (mild squamous dysplasia/ARSALAN-1).  Low-grade squamous intraepithelial lesion extends to the ectocervical and endocervical margins of the specimen.  Radial/stromal margin negative for dysplasia.    2.  CERVIX, \"6:00 PASS\", LEEP EXCISION:  Ectocervical tissue showing low-grade squamous intraepithelial lesion (HPV effect and mild squamous dysplasia).  Low-grade squamous intraepithelial lesion extends to the unoriented margin of the specimen.    3.  CERVIX, TOP-HAT EXCISION:  Low-grade squamous intraepithelial lesion (mild squamous dysplasia/ARSALAN-1).  Low-grade squamous intraepithelial lesion extends to both mucosal margins of the specimen (endocervical and ectocervical aspect).    4.  ENDOCERVICAL CURETTAGE:  Detached fragments of squamous epithelium with features consistent with low-grade squamous intraepithelial lesion.  Detached strips of benign endocervical epithelium.          Assessment   1. S/P LEEP  2. Dysuria      Plan "   1. Nothing per vagina still until 6 weeks after her procedure  2. UA with reflex culture ordered for dysuria   3. The importance of keeping all planned follow-up and taking all medications as prescribed was emphasized.  4. Follow up 4 weeks for pelvic exam     No orders of the defined types were placed in this encounter.         This note was electronically signed.    Esthela Jay MD .  October 27, 2022

## 2022-10-28 LAB
BACTERIA SPEC AEROBE CULT: NO GROWTH
BACTERIA UR QL AUTO: ABNORMAL /HPF
BILIRUB UR QL STRIP: NEGATIVE
CLARITY UR: ABNORMAL
COLOR UR: YELLOW
GLUCOSE UR STRIP-MCNC: NEGATIVE MG/DL
HGB UR QL STRIP.AUTO: ABNORMAL
HYALINE CASTS UR QL AUTO: ABNORMAL /LPF
KETONES UR QL STRIP: NEGATIVE
LEUKOCYTE ESTERASE UR QL STRIP.AUTO: ABNORMAL
NITRITE UR QL STRIP: NEGATIVE
PH UR STRIP.AUTO: 6.5 [PH] (ref 5–8)
PROT UR QL STRIP: ABNORMAL
RBC # UR STRIP: ABNORMAL /HPF
REF LAB TEST METHOD: ABNORMAL
SP GR UR STRIP: 1.02 (ref 1–1.03)
SQUAMOUS #/AREA URNS HPF: ABNORMAL /HPF
UROBILINOGEN UR QL STRIP: ABNORMAL
WBC # UR STRIP: ABNORMAL /HPF

## 2022-11-18 ENCOUNTER — PREP FOR SURGERY (OUTPATIENT)
Dept: OTHER | Facility: HOSPITAL | Age: 47
End: 2022-11-18

## 2022-11-18 ENCOUNTER — OFFICE VISIT (OUTPATIENT)
Dept: OBSTETRICS AND GYNECOLOGY | Facility: CLINIC | Age: 47
End: 2022-11-18

## 2022-11-18 VITALS
RESPIRATION RATE: 16 BRPM | WEIGHT: 148 LBS | BODY MASS INDEX: 22.43 KG/M2 | HEART RATE: 64 BPM | SYSTOLIC BLOOD PRESSURE: 98 MMHG | DIASTOLIC BLOOD PRESSURE: 76 MMHG | OXYGEN SATURATION: 98 % | HEIGHT: 68 IN

## 2022-11-18 DIAGNOSIS — R30.9 PAINFUL URINATION: ICD-10-CM

## 2022-11-18 DIAGNOSIS — Z87.898: ICD-10-CM

## 2022-11-18 DIAGNOSIS — N81.10 PROLAPSE OF ANTERIOR VAGINAL WALL: Primary | ICD-10-CM

## 2022-11-18 DIAGNOSIS — R33.9 INCOMPLETE BLADDER EMPTYING: ICD-10-CM

## 2022-11-18 DIAGNOSIS — Z98.890 S/P LEEP: Primary | ICD-10-CM

## 2022-11-18 DIAGNOSIS — F32.2 CURRENT SEVERE EPISODE OF MAJOR DEPRESSIVE DISORDER WITHOUT PSYCHOTIC FEATURES, UNSPECIFIED WHETHER RECURRENT: ICD-10-CM

## 2022-11-18 LAB
BACTERIA UR QL AUTO: ABNORMAL /HPF
BILIRUB UR QL STRIP: NEGATIVE
CLARITY UR: CLEAR
COLOR UR: YELLOW
GLUCOSE UR STRIP-MCNC: NEGATIVE MG/DL
HGB UR QL STRIP.AUTO: ABNORMAL
HYALINE CASTS UR QL AUTO: ABNORMAL /LPF
KETONES UR QL STRIP: NEGATIVE
LEUKOCYTE ESTERASE UR QL STRIP.AUTO: ABNORMAL
NITRITE UR QL STRIP: NEGATIVE
PH UR STRIP.AUTO: 6.5 [PH] (ref 5–8)
PROT UR QL STRIP: ABNORMAL
RBC # UR STRIP: ABNORMAL /HPF
REF LAB TEST METHOD: ABNORMAL
SP GR UR STRIP: 1.02 (ref 1–1.03)
SQUAMOUS #/AREA URNS HPF: ABNORMAL /HPF
UROBILINOGEN UR QL STRIP: ABNORMAL
WBC # UR STRIP: ABNORMAL /HPF

## 2022-11-18 PROCEDURE — 81001 URINALYSIS AUTO W/SCOPE: CPT | Performed by: STUDENT IN AN ORGANIZED HEALTH CARE EDUCATION/TRAINING PROGRAM

## 2022-11-18 PROCEDURE — 99213 OFFICE O/P EST LOW 20 MIN: CPT | Performed by: STUDENT IN AN ORGANIZED HEALTH CARE EDUCATION/TRAINING PROGRAM

## 2022-11-18 RX ORDER — ENOXAPARIN SODIUM 100 MG/ML
40 INJECTION SUBCUTANEOUS
Status: CANCELLED | OUTPATIENT
Start: 2022-11-18

## 2022-11-18 RX ORDER — SCOLOPAMINE TRANSDERMAL SYSTEM 1 MG/1
1 PATCH, EXTENDED RELEASE TRANSDERMAL CONTINUOUS
Status: CANCELLED | OUTPATIENT
Start: 2022-11-18 | End: 2022-11-21

## 2022-11-18 RX ORDER — SODIUM CHLORIDE, SODIUM LACTATE, POTASSIUM CHLORIDE, CALCIUM CHLORIDE 600; 310; 30; 20 MG/100ML; MG/100ML; MG/100ML; MG/100ML
125 INJECTION, SOLUTION INTRAVENOUS CONTINUOUS
Status: CANCELLED | OUTPATIENT
Start: 2022-11-18

## 2022-11-18 RX ORDER — SODIUM CHLORIDE 9 MG/ML
40 INJECTION, SOLUTION INTRAVENOUS AS NEEDED
Status: CANCELLED | OUTPATIENT
Start: 2022-11-18

## 2022-11-18 RX ORDER — GABAPENTIN 300 MG/1
600 CAPSULE ORAL ONCE
Status: CANCELLED | OUTPATIENT
Start: 2022-11-18 | End: 2022-11-18

## 2022-11-18 RX ORDER — SODIUM CHLORIDE 0.9 % (FLUSH) 0.9 %
3 SYRINGE (ML) INJECTION EVERY 12 HOURS SCHEDULED
Status: CANCELLED | OUTPATIENT
Start: 2022-11-18

## 2022-11-18 RX ORDER — CEFAZOLIN SODIUM 2 G/100ML
2 INJECTION, SOLUTION INTRAVENOUS ONCE
Status: CANCELLED | OUTPATIENT
Start: 2022-11-18 | End: 2022-11-18

## 2022-11-18 RX ORDER — ACETAMINOPHEN 500 MG
1000 TABLET ORAL ONCE
Status: CANCELLED | OUTPATIENT
Start: 2022-11-18 | End: 2022-11-18

## 2022-11-18 RX ORDER — SODIUM CHLORIDE 0.9 % (FLUSH) 0.9 %
10 SYRINGE (ML) INJECTION AS NEEDED
Status: CANCELLED | OUTPATIENT
Start: 2022-11-18

## 2022-11-18 NOTE — PROGRESS NOTES
"Subjective   Chief Complaint   Patient presents with   • Post-op Follow-up     Jeane Escalante is a 47 y.o. year old  presenting to be seen for her post-operative visit.  Currently she reports no problems with eating, bowel movements, and pain is well controlled. She continues to have baseline incomplete bladder emptying, vaginal pressure, postvoid dribble. Recent PVR last month at [facility] 0ml. She is also requesting a referral to Behavioral Health for depression and mental health needs.     The results have previously been discussed with Jeane.    The following portions of the patient's history were reviewed and updated as appropriate:current medications and allergies       Objective   BP 98/76   Pulse 64   Resp 16   Ht 172.7 cm (68\")   Wt 67.1 kg (148 lb)   SpO2 98%   BMI 22.50 kg/m²     General:  Well developed, no acute distress, A&O x3   Genitourinary:  Clinical staff present for exam   Normal external female genitalia  Urethra without masses  Vagina without bleeding or discharge.  Cervix normal in appearance and LEEP bed well-healed.  Cystocele grade 2-3 on Valsalva  Rectocele grade 1-2 on Valsalva  Apical uterine prolapse grade 1-2     Final Diagnosis   1.  CERVIX, LEEP CONE EXCISION:  Low-grade squamous intraepithelial lesion (mild squamous dysplasia/ARSALAN-1).  Low-grade squamous intraepithelial lesion extends to the ectocervical and endocervical margins of the specimen.  Radial/stromal margin negative for dysplasia.    2.  CERVIX, \"6:00 PASS\", LEEP EXCISION:  Ectocervical tissue showing low-grade squamous intraepithelial lesion (HPV effect and mild squamous dysplasia).  Low-grade squamous intraepithelial lesion extends to the unoriented margin of the specimen.    3.  CERVIX, TOP-HAT EXCISION:  Low-grade squamous intraepithelial lesion (mild squamous dysplasia/ARSALAN-1).  Low-grade squamous intraepithelial lesion extends to both mucosal margins of the specimen (endocervical and ectocervical aspect).    4.  " ENDOCERVICAL CURETTAGE:  Detached fragments of squamous epithelium with features consistent with low-grade squamous intraepithelial lesion.  Detached strips of benign endocervical epithelium.          Assessment   1. S/P LEEP  2. Grade 2-3 anterior vaginal wall prolapse   3. Grade 1-2 posterior vaginal wall prolapse   4. Grade 1-2 apical uterine prolapse   5. Incomplete bladder emptying   6. Anxiety/Depression     Plan   1. Patient evaluated with partner Dr. Ramirez. Extensively discussed severity of prolapse and symptoms, and need for surgical intervention.  Also extensively discussed need for concomitant vaginal hysterectomy at time of surgical intervention for prolapse and urinary complaints.  Patient voiced understanding and is in agreement with plan of care. Discussed risks vs benefits and alternatives to procedure, and the risk of infection, bleeding, injury to the bowel or bladder, need for Mcginnis catheter after procedure, urinary retention, and additional risks of anesthesia.  Also discussed surgery may not 100% guarantee cure of her symptoms, additional procedures may be indicated in the future.  Again, patient voiced understanding and elects to proceed with surgery.   2. Referral to Urology in the interim for long standing hematuria and painful urination.   3. Referral to Behavioral Health placed per patient request.   4. The importance of keeping all planned follow-up and taking all medications as prescribed was emphasized.  5. Follow up for: TVH, BS, anterior and posterior colporrhaphy, perineorrhaphy and uterosacral ligament suspension.     No orders of the defined types were placed in this encounter.         This note was electronically signed.    Esthela Jay MD .  November 18, 2022

## 2023-01-17 ENCOUNTER — TELEPHONE (OUTPATIENT)
Dept: OBSTETRICS AND GYNECOLOGY | Facility: CLINIC | Age: 48
End: 2023-01-17

## 2023-01-17 NOTE — TELEPHONE ENCOUNTER
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    THANK YOU!

## 2023-01-23 PROBLEM — R31.21 ASYMPTOMATIC MICROSCOPIC HEMATURIA: Status: RESOLVED | Noted: 2019-07-29 | Resolved: 2023-01-23

## 2023-01-23 PROBLEM — Z01.419 WELL WOMAN EXAM: Status: ACTIVE | Noted: 2023-01-23

## 2023-01-23 PROBLEM — N81.10 PROLAPSE OF ANTERIOR VAGINAL WALL: Status: RESOLVED | Noted: 2022-11-18 | Resolved: 2023-01-23

## 2023-01-23 PROBLEM — R87.612 LGSIL ON PAP SMEAR OF CERVIX: Status: ACTIVE | Noted: 2022-08-05

## 2023-01-23 PROBLEM — R87.612 LGSIL ON PAP SMEAR OF CERVIX: Status: ACTIVE | Noted: 2023-01-23

## 2023-01-24 ENCOUNTER — OFFICE VISIT (OUTPATIENT)
Dept: OBSTETRICS AND GYNECOLOGY | Facility: CLINIC | Age: 48
End: 2023-01-24
Payer: COMMERCIAL

## 2023-01-24 ENCOUNTER — LAB (OUTPATIENT)
Dept: LAB | Facility: HOSPITAL | Age: 48
End: 2023-01-24
Payer: COMMERCIAL

## 2023-01-24 VITALS
DIASTOLIC BLOOD PRESSURE: 68 MMHG | SYSTOLIC BLOOD PRESSURE: 110 MMHG | WEIGHT: 145 LBS | BODY MASS INDEX: 22.05 KG/M2 | RESPIRATION RATE: 14 BRPM

## 2023-01-24 DIAGNOSIS — R35.0 URINARY FREQUENCY: ICD-10-CM

## 2023-01-24 DIAGNOSIS — Z11.3 SCREENING FOR VENEREAL DISEASE: Primary | ICD-10-CM

## 2023-01-24 DIAGNOSIS — Z11.3 SCREENING FOR VENEREAL DISEASE: ICD-10-CM

## 2023-01-24 PROBLEM — N39.3 SUI (STRESS URINARY INCONTINENCE, FEMALE): Status: RESOLVED | Noted: 2019-07-29 | Resolved: 2023-01-24

## 2023-01-24 LAB
BACTERIA UR QL AUTO: ABNORMAL /HPF
BILIRUB UR QL STRIP: NEGATIVE
CLARITY UR: CLEAR
COLOR UR: YELLOW
GLUCOSE UR STRIP-MCNC: NEGATIVE MG/DL
HGB UR QL STRIP.AUTO: ABNORMAL
HYALINE CASTS UR QL AUTO: ABNORMAL /LPF
KETONES UR QL STRIP: NEGATIVE
LEUKOCYTE ESTERASE UR QL STRIP.AUTO: NEGATIVE
NITRITE UR QL STRIP: NEGATIVE
PH UR STRIP.AUTO: 5.5 [PH] (ref 5–8)
PROT UR QL STRIP: NEGATIVE
RBC # UR STRIP: ABNORMAL /HPF
REF LAB TEST METHOD: ABNORMAL
SP GR UR STRIP: 1.02 (ref 1–1.03)
SQUAMOUS #/AREA URNS HPF: ABNORMAL /HPF
UROBILINOGEN UR QL STRIP: ABNORMAL
WBC # UR STRIP: ABNORMAL /HPF

## 2023-01-24 PROCEDURE — 81001 URINALYSIS AUTO W/SCOPE: CPT

## 2023-01-24 PROCEDURE — 99214 OFFICE O/P EST MOD 30 MIN: CPT | Performed by: OBSTETRICS & GYNECOLOGY

## 2023-01-24 NOTE — PROGRESS NOTES
Subjective   Chief Complaint   Patient presents with   • Dysmenorrhea     Jeane Escalante is a 47 y.o. year old .  No LMP recorded. Patient has had an implant.  She is a patient of [facility].  She is scheduled to have surgery in a couple weeks related to prolapse.  She comes today to be worked in because she has been having issues with cramps and pain.  She try to reach Dr. Jay's office and they were unable to get her worked in which is why I am seeing her today.    She has been having symptoms for about a month.  Symptoms are getting worse.  She does have concerns about STDs having been sexually active with a former partner who she does not trust his fidelity.  She also has urinary symptoms and think she needs to be checked for UTI.  She does have her Mirena.  It was placed about 2 years ago.  There has been no fever.  Has not been taking pain medicine other than over-the-counter ibuprofen for the pain.  Pain in the lower quadrants bilaterally.  She also has low back pain at times.  There is been no discharge with odor but there has been a little spotting unexpectedly.    She does have chronic constipation this been present for several months.  It has been worse recently.  She does have a bowel movement daily frequently the shape of the stool can be small round balls.    She does have a very little bit of stress incontinence.    OTHER THINGS SHE WANTS TO DISCUSS TODAY:  Nothing else    The following portions of the patient's history were reviewed and updated as appropriate:current medications, allergies, past family history, past medical history, past social history and past surgical history    Social History    Tobacco Use      Smoking status: Every Day        Packs/day: 1.00        Years: 20.00        Pack years: 20        Types: Cigarettes        Start date:       Smokeless tobacco: Never    Review of Systems  Constitutional POS: nothing reported    NEG: anorexia or night sweats   Genitourinary POS:  see HPI    NEG: dysuria or hematuria   Gastointestinal POS: see HPI    NEG: bloating, change in bowel habits, melena or reflux symptoms   Integument POS: nothing reported    NEG: moles that are changing in size, shape, color or rashes   Breast POS: nothing reported    NEG: persistent breast lump, skin dimpling or nipple discharge         Objective   /68   Resp 14   Wt 65.8 kg (145 lb)   BMI 22.05 kg/m²     General:  well developed; well nourished  no acute distress   Pelvis: Clinical staff was present for exam  External genitalia:  normal appearance of the external genitalia including Bartholin's and [place]'s glands.  :  urethral meatus normal;  Vaginal:  normal pink mucosa without prolapse or lesions.  Cervix:  normal appearance. cervical motion tenderness is absent; IUD string present - 1 cms in length;  Uterus:  normal size, shape and consistency. retroverted;     Lab Review   No data reviewed    Imaging   No data reviewed        Assessment   1. Pelvic cramps with differential to include sexually transmitted disease, urinary tract infection or related to her chronic constipation.  Exam inconsistent with PID     Plan   1. The following tests were ordered today: STD swabs for GC, chlamydia and trichimoniasis and UA.  It was explained to Jeane that all lab test should be back within the one week after they are performed. She will be notified about the results, regardless of the findings. If she has not been contacted by the office within 2 weeks after the test has been performed, it is her responsibility to contact us to learn about her results.  2. The importance of keeping all planned follow-up and taking all medications as prescribed was emphasized.  3. Follow up PRN          This note was electronically signed.    Felipe Ramirez M.D.  January 24, 2023    Part of this note may be an electronic transcription/translation of spoken language to printed text using the Dragon Dictation System.

## 2023-01-27 PROBLEM — N81.10 PROLAPSE OF ANTERIOR VAGINAL WALL: Status: ACTIVE | Noted: 2022-11-30

## 2023-02-02 ENCOUNTER — TELEPHONE (OUTPATIENT)
Dept: OBSTETRICS AND GYNECOLOGY | Facility: CLINIC | Age: 48
End: 2023-02-02

## 2023-02-02 NOTE — TELEPHONE ENCOUNTER
Caller: ANÍBAL     Relationship: SELF    Best call back number: [phone]    What form or medical record are you requesting: [facility]     Who is requesting this form or medical record from you: WORK     How would you like to receive the form or medical records (pick-up, mail, fax): FAX  If fax, what is the fax number:   [phone]  SHE WOULD ALSO LIKE THE NEW COPY MAILED TO HER  [address] [place], [place] [id number]    Timeframe paperwork needed: ASAP    Additional notes: PT SURGERY WAS MOVED UP TO 02.08.2023.  HER [facility] PAPERWORK HAS THE ORIGINAL SURGERY DATE 02.22.2023.  SHE NEEDS THE PAPERWORK RE FAXED OVER WITH THE NEW SURGERY DATE FOR HER TO BE ABLE TO GET OFF.  SHE ALSO NEEDS TO HAVE HER WORK RESTRICTIONS ON THE PAPERWORK.  SHE WOULD LIKE TO BE ABLE TO START TO WORK FROM HOME AFTER THE FIRST TWO WEEKS. IF POSSIBLE FOR A FEW HOURS A DAY.  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         ”

## 2023-02-02 NOTE — TELEPHONE ENCOUNTER
Dr. Jay's patient   [phone] returned patient's call and advised patient her FMLA forms has been updated it's on the back page. Per patient she states her employer has not received the updated forms. I advised patient I will refax over her forms. Patient verbalized understanding.

## 2023-02-03 ENCOUNTER — PRE-ADMISSION TESTING (OUTPATIENT)
Dept: PREADMISSION TESTING | Facility: HOSPITAL | Age: 48
End: 2023-02-03
Payer: COMMERCIAL

## 2023-02-03 VITALS — BODY MASS INDEX: 23.14 KG/M2 | WEIGHT: 152.67 LBS | HEIGHT: 68 IN

## 2023-02-03 DIAGNOSIS — Z01.89 LABORATORY TEST: Primary | ICD-10-CM

## 2023-02-03 DIAGNOSIS — N81.10 PROLAPSE OF ANTERIOR VAGINAL WALL: ICD-10-CM

## 2023-02-03 LAB
ABO GROUP BLD: NORMAL
ALBUMIN SERPL-MCNC: 4.5 G/DL (ref 3.5–5.2)
ALBUMIN/GLOB SERPL: 1.6 G/DL
ALP SERPL-CCNC: 93 U/L (ref 39–117)
ALT SERPL W P-5'-P-CCNC: 10 U/L (ref 1–33)
ANION GAP SERPL CALCULATED.3IONS-SCNC: 9 MMOL/L (ref 5–15)
AST SERPL-CCNC: 13 U/L (ref 1–32)
BASOPHILS # BLD AUTO: 0.09 10*3/MM3 (ref 0–0.2)
BASOPHILS NFR BLD AUTO: 0.9 % (ref 0–1.5)
BILIRUB SERPL-MCNC: 0.2 MG/DL (ref 0–1.2)
BUN SERPL-MCNC: 11 MG/DL (ref 6–20)
BUN/CREAT SERPL: 14.9 (ref 7–25)
CALCIUM SPEC-SCNC: 9.3 MG/DL (ref 8.6–10.5)
CHLORIDE SERPL-SCNC: 104 MMOL/L (ref 98–107)
CO2 SERPL-SCNC: 29 MMOL/L (ref 22–29)
CREAT SERPL-MCNC: 0.74 MG/DL (ref 0.57–1)
DEPRECATED RDW RBC AUTO: 42.8 FL (ref 37–54)
EGFRCR SERPLBLD CKD-EPI 2021: 101.8 ML/MIN/1.73
EOSINOPHIL # BLD AUTO: 0.19 10*3/MM3 (ref 0–0.4)
EOSINOPHIL NFR BLD AUTO: 1.9 % (ref 0.3–6.2)
ERYTHROCYTE [DISTWIDTH] IN BLOOD BY AUTOMATED COUNT: 12.7 % (ref 12.3–15.4)
GLOBULIN UR ELPH-MCNC: 2.8 GM/DL
GLUCOSE SERPL-MCNC: 88 MG/DL (ref 65–99)
HBA1C MFR BLD: 5.5 % (ref 4.8–5.6)
HCT VFR BLD AUTO: 45.4 % (ref 34–46.6)
HGB BLD-MCNC: 14.9 G/DL (ref 12–15.9)
IMM GRANULOCYTES # BLD AUTO: 0.03 10*3/MM3 (ref 0–0.05)
IMM GRANULOCYTES NFR BLD AUTO: 0.3 % (ref 0–0.5)
LYMPHOCYTES # BLD AUTO: 3.55 10*3/MM3 (ref 0.7–3.1)
LYMPHOCYTES NFR BLD AUTO: 35.2 % (ref 19.6–45.3)
MCH RBC QN AUTO: 30.2 PG (ref 26.6–33)
MCHC RBC AUTO-ENTMCNC: 32.8 G/DL (ref 31.5–35.7)
MCV RBC AUTO: 91.9 FL (ref 79–97)
MONOCYTES # BLD AUTO: 0.77 10*3/MM3 (ref 0.1–0.9)
MONOCYTES NFR BLD AUTO: 7.6 % (ref 5–12)
NEUTROPHILS NFR BLD AUTO: 5.46 10*3/MM3 (ref 1.7–7)
NEUTROPHILS NFR BLD AUTO: 54.1 % (ref 42.7–76)
NRBC BLD AUTO-RTO: 0 /100 WBC (ref 0–0.2)
PLATELET # BLD AUTO: 274 10*3/MM3 (ref 140–450)
PMV BLD AUTO: 10.5 FL (ref 6–12)
POTASSIUM SERPL-SCNC: 4.2 MMOL/L (ref 3.5–5.2)
PROT SERPL-MCNC: 7.3 G/DL (ref 6–8.5)
RBC # BLD AUTO: 4.94 10*6/MM3 (ref 3.77–5.28)
RH BLD: POSITIVE
SODIUM SERPL-SCNC: 142 MMOL/L (ref 136–145)
WBC NRBC COR # BLD: 10.09 10*3/MM3 (ref 3.4–10.8)

## 2023-02-03 PROCEDURE — 86900 BLOOD TYPING SEROLOGIC ABO: CPT

## 2023-02-03 PROCEDURE — 85025 COMPLETE CBC W/AUTO DIFF WBC: CPT

## 2023-02-03 PROCEDURE — 86901 BLOOD TYPING SEROLOGIC RH(D): CPT

## 2023-02-03 PROCEDURE — 83036 HEMOGLOBIN GLYCOSYLATED A1C: CPT

## 2023-02-03 PROCEDURE — 80053 COMPREHEN METABOLIC PANEL: CPT

## 2023-02-03 PROCEDURE — 36415 COLL VENOUS BLD VENIPUNCTURE: CPT

## 2023-02-03 NOTE — PAT
An arrival time for procedure was not provided during PAT visit. If patient had any questions or concerns about their arrival time, they were instructed to contact their surgeon/physician.  Additionally, if the patient referred to an arrival time that was acquired from their my chart account, patient was encouraged to verify that time with their surgeon/physician. Arrival times are NOT provided in Pre Admission Testing Department.    Patient instructed to drink 20 ounces of Gatorade and it needs to be completed 1 hour (for Main OR patients) or 2 hours (scheduled  section & [facility] patients) before given arrival time for procedure (NO RED Gatorade)    Patient verbalized understanding.    Instructed patient to take two Tylenol extra strength (total of 1000 mg) the night before surgery.    Patient to apply Chlorhexadine wipes  to surgical area (as instructed) the night before procedure and the AM of procedure. Wipes provided.    Patient viewed general PAT education video as instructed in their preoperative information received from their surgeon.  Patient stated the general PAT education video was viewed in its entirety and survey completed.  Copies of PAT general education handouts (Incentive Spirometry, Meds to Beds Program, Patient Belongings, Pre-op skin preparation instructions, Blood Glucose testing, Visitor policy, Surgery FAQ, Code H) distributed to patient if not printed. Education related to the PAT pass and skin preparation for surgery (if applicable) completed in PAT as a reinforcement to PAT education video. Patient instructed to return PAT pass provided today as well as completed skin preparation sheet (if applicable) on the day of procedure.     Additionally if patient had not viewed video yet but intended to view it at home or in our waiting area, then referred them to the handout with QR code/link provided during PAT visit.  Instructed patient to complete survey after viewing the video in its  entirety.  Encouraged patient/family to read [facility] general education handouts thoroughly and notify PAT staff with any questions or concerns. Patient verbalized understanding of all information and priority content.    Too early to draw type and screen in PAT.  Please obtain blood bank specimen in pre-op on the day of surgery.    PT TO SIGN CONSENT DOS. PT HAS QUESTIONS FOR DR. GONZALEZ.

## 2023-02-07 NOTE — H&P
HAJA Ramírez  Jeane Escalante  : 1975  MRN: [id number]  [facility]: [id number]    History and Physical    Subjective   Jeane Escalante is a 47 y.o. year old  who presents for scheduled procedure. She has a known history of baseline incomplete bladder emptying, vaginal pressure, postvoid dribble and multi vaginal compartment prolapse. Due to constellation of symptoms affecting her daily life with constant vaginal pressure, decision made to proceed with TVH, BS with partner, Dr. Ramirez, performing anterior and posterior colporrhaphy, perineorrhaphy and uterosacral ligament suspension.     Past Medical History:   Diagnosis Date   • Anxiety and depression 2019    Started after friends murder   • Trichimoniasis 2022    PT STATED TEST CAME BACK NEGATIVE   • History of hematuria      Past Surgical History:   Procedure Laterality Date   • D & C HYSTEROSCOPY  2020    with mole removal from inner left thigh.   • LEEP  10/14/2022    LGSIL with (+) margins     OB History    Para Term  AB Living   4 4 4 0 0 4   SAB IAB Ectopic Molar Multiple Live Births   0 0 0 0 0 4      # Outcome Date GA Lbr Cole/2nd Weight Sex Delivery Anes PTL Lv   4 Term 03   2722 g (6 lb) M Vag-Spont EPI N CASS      Name: Pablo   3 Term 02   2722 g (6 lb) F Vag-Spont EPI  CASS      Name: Stacie   2 Term 98 40w3d  3629 g (8 lb) M Vag-Spont EPI  CASS      Name: Dany   1 Term 10/03/96 42w0d  3742 g (8 lb 4 oz) M Vag-Spont EPI N CASS      Name: Ryanjoshua     Social History    Tobacco Use      Smoking status: Every Day        Packs/day: 1.00        Years: 20.00        Pack years: 20        Types: Cigarettes        Start date:       Smokeless tobacco: Never    No current facility-administered medications for this encounter.    Current Outpatient Medications:   •  Levonorgestrel (MIRENA) 20 MCG/DAY intrauterine device IUD, 1 each by Intrauterine route Every 8 (Eight) Years., Disp: , Rfl:     Allergies    Allergen Reactions   • Sulfa Antibiotics Anaphylaxis   • Adhesive Tape Hives   • Sudafed [Pseudoephedrine] Rash   • Zithromax [Azithromycin] Dizziness       Review of Systems   Genitourinary: Positive for pelvic pain and urgency.   All other systems reviewed and are negative.        Objective     Vital Signs: See nursing documentation   General: well developed; well nourished  no acute distress   Mental status: Alert and oriented   Heart: Not performed.   Lungs: breathing is unlabored   Abdomen: soft, non-tender; no masses  no umbilical or inguinal hernias are present   Pelvis: deferred        Assessment   1. Grade 2-3 anterior vaginal wall prolapse   2. Grade 1-2 posterior vaginal wall prolapse   3. Grade 1-2 apical uterine prolapse      Plan   1. See previous clinic note for full surgical counseling.   2. To OR for TVH, BS, anterior and posterior colporrhaphy, perineorrhaphy and uterosacral ligament suspension.         Esthela Jay MD       (Pt's PCP is Luz Reyes APRN)

## 2023-02-08 ENCOUNTER — ANESTHESIA (OUTPATIENT)
Dept: PERIOP | Facility: HOSPITAL | Age: 48
DRG: 742 | End: 2023-02-08
Payer: COMMERCIAL

## 2023-02-08 ENCOUNTER — ANESTHESIA EVENT (OUTPATIENT)
Dept: PERIOP | Facility: HOSPITAL | Age: 48
DRG: 742 | End: 2023-02-08
Payer: COMMERCIAL

## 2023-02-08 ENCOUNTER — APPOINTMENT (OUTPATIENT)
Dept: GENERAL RADIOLOGY | Facility: HOSPITAL | Age: 48
DRG: 742 | End: 2023-02-08
Payer: COMMERCIAL

## 2023-02-08 ENCOUNTER — HOSPITAL ENCOUNTER (INPATIENT)
Facility: HOSPITAL | Age: 48
LOS: 3 days | Discharge: HOME | DRG: 742 | End: 2023-02-11
Attending: STUDENT IN AN ORGANIZED HEALTH CARE EDUCATION/TRAINING PROGRAM | Admitting: STUDENT IN AN ORGANIZED HEALTH CARE EDUCATION/TRAINING PROGRAM
Payer: COMMERCIAL

## 2023-02-08 DIAGNOSIS — N81.10 PROLAPSE OF ANTERIOR VAGINAL WALL: ICD-10-CM

## 2023-02-08 DIAGNOSIS — Z98.890 POST-OPERATIVE STATE: Primary | ICD-10-CM

## 2023-02-08 PROBLEM — Z97.5 IUD (INTRAUTERINE DEVICE) IN PLACE: Status: RESOLVED | Noted: 2019-07-30 | Resolved: 2023-02-08

## 2023-02-08 PROBLEM — N81.4 UTERINE PROLAPSE: Status: ACTIVE | Noted: 2023-02-08

## 2023-02-08 PROBLEM — N81.11 CYSTOCELE, MIDLINE: Status: RESOLVED | Noted: 2022-11-30 | Resolved: 2023-02-08

## 2023-02-08 PROBLEM — N81.6 RECTOCELE: Status: RESOLVED | Noted: 2023-02-08 | Resolved: 2023-02-08

## 2023-02-08 PROBLEM — N81.6 RECTOCELE: Status: ACTIVE | Noted: 2023-02-08

## 2023-02-08 PROBLEM — N81.4 UTERINE PROLAPSE: Status: RESOLVED | Noted: 2023-02-08 | Resolved: 2023-02-08

## 2023-02-08 PROBLEM — N81.11 CYSTOCELE, MIDLINE: Status: ACTIVE | Noted: 2022-11-30

## 2023-02-08 LAB
ABO GROUP BLD: NORMAL
B-HCG UR QL: NEGATIVE
BLD GP AB SCN SERPL QL: NEGATIVE
DEPRECATED RDW RBC AUTO: 42.3 FL (ref 37–54)
ERYTHROCYTE [DISTWIDTH] IN BLOOD BY AUTOMATED COUNT: 12.7 % (ref 12.3–15.4)
EXPIRATION DATE: NORMAL
HCT VFR BLD AUTO: 38 % (ref 34–46.6)
HGB BLD-MCNC: 12.8 G/DL (ref 12–15.9)
INTERNAL NEGATIVE CONTROL: NORMAL
INTERNAL POSITIVE CONTROL: NORMAL
Lab: NORMAL
MCH RBC QN AUTO: 30.8 PG (ref 26.6–33)
MCHC RBC AUTO-ENTMCNC: 33.7 G/DL (ref 31.5–35.7)
MCV RBC AUTO: 91.3 FL (ref 79–97)
PLATELET # BLD AUTO: 250 10*3/MM3 (ref 140–450)
PMV BLD AUTO: 10.8 FL (ref 6–12)
RBC # BLD AUTO: 4.16 10*6/MM3 (ref 3.77–5.28)
RH BLD: POSITIVE
T&S EXPIRATION DATE: NORMAL
WBC NRBC COR # BLD: 14.16 10*3/MM3 (ref 3.4–10.8)

## 2023-02-08 PROCEDURE — 25010000002 FENTANYL CITRATE (PF) 50 MCG/ML SOLUTION

## 2023-02-08 PROCEDURE — 86901 BLOOD TYPING SEROLOGIC RH(D): CPT | Performed by: STUDENT IN AN ORGANIZED HEALTH CARE EDUCATION/TRAINING PROGRAM

## 2023-02-08 PROCEDURE — 88341 IMHCHEM/IMCYTCHM EA ADD ANTB: CPT | Performed by: STUDENT IN AN ORGANIZED HEALTH CARE EDUCATION/TRAINING PROGRAM

## 2023-02-08 PROCEDURE — 58262 VAG HYST INCLUDING T/O: CPT | Performed by: OBSTETRICS & GYNECOLOGY

## 2023-02-08 PROCEDURE — 25010000002 ONDANSETRON PER 1 MG: Performed by: NURSE ANESTHETIST, CERTIFIED REGISTERED

## 2023-02-08 PROCEDURE — 0UT97ZZ RESECTION OF UTERUS, VIA NATURAL OR ARTIFICIAL OPENING: ICD-10-PCS | Performed by: STUDENT IN AN ORGANIZED HEALTH CARE EDUCATION/TRAINING PROGRAM

## 2023-02-08 PROCEDURE — 57260 CMBN ANT PST COLPRHY: CPT | Performed by: OBSTETRICS & GYNECOLOGY

## 2023-02-08 PROCEDURE — 0UQ48ZZ REPAIR UTERINE SUPPORTING STRUCTURE, VIA NATURAL OR ARTIFICIAL OPENING ENDOSCOPIC: ICD-10-PCS | Performed by: STUDENT IN AN ORGANIZED HEALTH CARE EDUCATION/TRAINING PROGRAM

## 2023-02-08 PROCEDURE — 58400 SUSPENSION OF UTERUS: CPT | Performed by: STUDENT IN AN ORGANIZED HEALTH CARE EDUCATION/TRAINING PROGRAM

## 2023-02-08 PROCEDURE — 58262 VAG HYST INCLUDING T/O: CPT | Performed by: STUDENT IN AN ORGANIZED HEALTH CARE EDUCATION/TRAINING PROGRAM

## 2023-02-08 PROCEDURE — 0UT74ZZ RESECTION OF BILATERAL FALLOPIAN TUBES, PERCUTANEOUS ENDOSCOPIC APPROACH: ICD-10-PCS | Performed by: STUDENT IN AN ORGANIZED HEALTH CARE EDUCATION/TRAINING PROGRAM

## 2023-02-08 PROCEDURE — 25010000002 DEXAMETHASONE PER 1 MG: Performed by: NURSE ANESTHETIST, CERTIFIED REGISTERED

## 2023-02-08 PROCEDURE — 88342 IMHCHEM/IMCYTCHM 1ST ANTB: CPT | Performed by: STUDENT IN AN ORGANIZED HEALTH CARE EDUCATION/TRAINING PROGRAM

## 2023-02-08 PROCEDURE — 86900 BLOOD TYPING SEROLOGIC ABO: CPT | Performed by: STUDENT IN AN ORGANIZED HEALTH CARE EDUCATION/TRAINING PROGRAM

## 2023-02-08 PROCEDURE — 25010000002 KETOROLAC TROMETHAMINE PER 15 MG: Performed by: NURSE ANESTHETIST, CERTIFIED REGISTERED

## 2023-02-08 PROCEDURE — 57260 CMBN ANT PST COLPRHY: CPT | Performed by: STUDENT IN AN ORGANIZED HEALTH CARE EDUCATION/TRAINING PROGRAM

## 2023-02-08 PROCEDURE — 25010000002 FENTANYL CITRATE (PF) 100 MCG/2ML SOLUTION: Performed by: NURSE ANESTHETIST, CERTIFIED REGISTERED

## 2023-02-08 PROCEDURE — 85027 COMPLETE CBC AUTOMATED: CPT | Performed by: OBSTETRICS & GYNECOLOGY

## 2023-02-08 PROCEDURE — 88305 TISSUE EXAM BY PATHOLOGIST: CPT | Performed by: STUDENT IN AN ORGANIZED HEALTH CARE EDUCATION/TRAINING PROGRAM

## 2023-02-08 PROCEDURE — 0JQC0ZZ REPAIR PELVIC REGION SUBCUTANEOUS TISSUE AND FASCIA, OPEN APPROACH: ICD-10-PCS | Performed by: OBSTETRICS & GYNECOLOGY

## 2023-02-08 PROCEDURE — 25010000002 PROPOFOL 10 MG/ML EMULSION: Performed by: NURSE ANESTHETIST, CERTIFIED REGISTERED

## 2023-02-08 PROCEDURE — 25010000002 HYDROMORPHONE 1 MG/ML SOLUTION

## 2023-02-08 PROCEDURE — 81025 URINE PREGNANCY TEST: CPT | Performed by: ANESTHESIOLOGY

## 2023-02-08 PROCEDURE — 25010000002 HYDROMORPHONE 1 MG/ML SOLUTION: Performed by: OBSTETRICS & GYNECOLOGY

## 2023-02-08 PROCEDURE — 0USG0ZZ REPOSITION VAGINA, OPEN APPROACH: ICD-10-PCS | Performed by: OBSTETRICS & GYNECOLOGY

## 2023-02-08 PROCEDURE — 25010000002 CEFAZOLIN IN DEXTROSE 2-4 GM/100ML-% SOLUTION: Performed by: STUDENT IN AN ORGANIZED HEALTH CARE EDUCATION/TRAINING PROGRAM

## 2023-02-08 PROCEDURE — 86850 RBC ANTIBODY SCREEN: CPT | Performed by: STUDENT IN AN ORGANIZED HEALTH CARE EDUCATION/TRAINING PROGRAM

## 2023-02-08 PROCEDURE — 58400 SUSPENSION OF UTERUS: CPT | Performed by: OBSTETRICS & GYNECOLOGY

## 2023-02-08 PROCEDURE — 25010000002 ENOXAPARIN PER 10 MG: Performed by: STUDENT IN AN ORGANIZED HEALTH CARE EDUCATION/TRAINING PROGRAM

## 2023-02-08 RX ORDER — SODIUM CHLORIDE, SODIUM LACTATE, POTASSIUM CHLORIDE, CALCIUM CHLORIDE 600; 310; 30; 20 MG/100ML; MG/100ML; MG/100ML; MG/100ML
9 INJECTION, SOLUTION INTRAVENOUS CONTINUOUS
Status: DISCONTINUED | OUTPATIENT
Start: 2023-02-08 | End: 2023-02-08

## 2023-02-08 RX ORDER — CEFAZOLIN SODIUM 2 G/100ML
2 INJECTION, SOLUTION INTRAVENOUS ONCE
Status: COMPLETED | OUTPATIENT
Start: 2023-02-08 | End: 2023-02-08

## 2023-02-08 RX ORDER — SODIUM CHLORIDE, SODIUM LACTATE, POTASSIUM CHLORIDE, CALCIUM CHLORIDE 600; 310; 30; 20 MG/100ML; MG/100ML; MG/100ML; MG/100ML
125 INJECTION, SOLUTION INTRAVENOUS CONTINUOUS
Status: DISCONTINUED | OUTPATIENT
Start: 2023-02-08 | End: 2023-02-09

## 2023-02-08 RX ORDER — ACETAMINOPHEN 500 MG
1000 TABLET ORAL ONCE
Status: COMPLETED | OUTPATIENT
Start: 2023-02-08 | End: 2023-02-08

## 2023-02-08 RX ORDER — FAMOTIDINE 20 MG/1
20 TABLET, FILM COATED ORAL ONCE
Status: COMPLETED | OUTPATIENT
Start: 2023-02-08 | End: 2023-02-08

## 2023-02-08 RX ORDER — SCOLOPAMINE TRANSDERMAL SYSTEM 1 MG/1
1 PATCH, EXTENDED RELEASE TRANSDERMAL CONTINUOUS
Status: DISCONTINUED | OUTPATIENT
Start: 2023-02-08 | End: 2023-02-08

## 2023-02-08 RX ORDER — MIDAZOLAM HYDROCHLORIDE 1 MG/ML
1 INJECTION INTRAMUSCULAR; INTRAVENOUS
Status: DISCONTINUED | OUTPATIENT
Start: 2023-02-08 | End: 2023-02-08 | Stop reason: HOSPADM

## 2023-02-08 RX ORDER — HYDROMORPHONE HYDROCHLORIDE 1 MG/ML
0.5 INJECTION, SOLUTION INTRAMUSCULAR; INTRAVENOUS; SUBCUTANEOUS
Status: DISCONTINUED | OUTPATIENT
Start: 2023-02-08 | End: 2023-02-11 | Stop reason: HOSPADM

## 2023-02-08 RX ORDER — ONDANSETRON 4 MG/1
4 TABLET, FILM COATED ORAL EVERY 6 HOURS PRN
Status: DISCONTINUED | OUTPATIENT
Start: 2023-02-08 | End: 2023-02-11 | Stop reason: HOSPADM

## 2023-02-08 RX ORDER — PROPOFOL 10 MG/ML
VIAL (ML) INTRAVENOUS AS NEEDED
Status: DISCONTINUED | OUTPATIENT
Start: 2023-02-08 | End: 2023-02-08 | Stop reason: SURG

## 2023-02-08 RX ORDER — IPRATROPIUM BROMIDE AND ALBUTEROL SULFATE 2.5; .5 MG/3ML; MG/3ML
3 SOLUTION RESPIRATORY (INHALATION) ONCE AS NEEDED
Status: DISCONTINUED | OUTPATIENT
Start: 2023-02-08 | End: 2023-02-08 | Stop reason: HOSPADM

## 2023-02-08 RX ORDER — ONDANSETRON 2 MG/ML
INJECTION INTRAMUSCULAR; INTRAVENOUS AS NEEDED
Status: DISCONTINUED | OUTPATIENT
Start: 2023-02-08 | End: 2023-02-08 | Stop reason: SURG

## 2023-02-08 RX ORDER — GABAPENTIN 300 MG/1
600 CAPSULE ORAL ONCE
Status: COMPLETED | OUTPATIENT
Start: 2023-02-08 | End: 2023-02-08

## 2023-02-08 RX ORDER — SODIUM CHLORIDE 9 MG/ML
40 INJECTION, SOLUTION INTRAVENOUS AS NEEDED
Status: CANCELLED | OUTPATIENT
Start: 2023-02-08

## 2023-02-08 RX ORDER — ONDANSETRON 2 MG/ML
4 INJECTION INTRAMUSCULAR; INTRAVENOUS ONCE AS NEEDED
Status: DISCONTINUED | OUTPATIENT
Start: 2023-02-08 | End: 2023-02-08 | Stop reason: HOSPADM

## 2023-02-08 RX ORDER — KETOROLAC TROMETHAMINE 30 MG/ML
INJECTION, SOLUTION INTRAMUSCULAR; INTRAVENOUS AS NEEDED
Status: DISCONTINUED | OUTPATIENT
Start: 2023-02-08 | End: 2023-02-08 | Stop reason: SURG

## 2023-02-08 RX ORDER — DEXAMETHASONE SODIUM PHOSPHATE 4 MG/ML
INJECTION, SOLUTION INTRA-ARTICULAR; INTRALESIONAL; INTRAMUSCULAR; INTRAVENOUS; SOFT TISSUE AS NEEDED
Status: DISCONTINUED | OUTPATIENT
Start: 2023-02-08 | End: 2023-02-08 | Stop reason: SURG

## 2023-02-08 RX ORDER — GLYCOPYRROLATE 0.2 MG/ML
INJECTION INTRAMUSCULAR; INTRAVENOUS AS NEEDED
Status: DISCONTINUED | OUTPATIENT
Start: 2023-02-08 | End: 2023-02-08 | Stop reason: SURG

## 2023-02-08 RX ORDER — EPHEDRINE SULFATE 50 MG/ML
INJECTION INTRAVENOUS AS NEEDED
Status: DISCONTINUED | OUTPATIENT
Start: 2023-02-08 | End: 2023-02-08 | Stop reason: SURG

## 2023-02-08 RX ORDER — LIDOCAINE HYDROCHLORIDE 10 MG/ML
INJECTION, SOLUTION EPIDURAL; INFILTRATION; INTRACAUDAL; PERINEURAL AS NEEDED
Status: DISCONTINUED | OUTPATIENT
Start: 2023-02-08 | End: 2023-02-08 | Stop reason: SURG

## 2023-02-08 RX ORDER — LIDOCAINE HYDROCHLORIDE 10 MG/ML
0.5 INJECTION, SOLUTION EPIDURAL; INFILTRATION; INTRACAUDAL; PERINEURAL ONCE AS NEEDED
Status: COMPLETED | OUTPATIENT
Start: 2023-02-08 | End: 2023-02-08

## 2023-02-08 RX ORDER — FENTANYL CITRATE 50 UG/ML
50 INJECTION, SOLUTION INTRAMUSCULAR; INTRAVENOUS
Status: DISCONTINUED | OUTPATIENT
Start: 2023-02-08 | End: 2023-02-08 | Stop reason: HOSPADM

## 2023-02-08 RX ORDER — ENOXAPARIN SODIUM 100 MG/ML
40 INJECTION SUBCUTANEOUS
Status: DISCONTINUED | OUTPATIENT
Start: 2023-02-08 | End: 2023-02-08 | Stop reason: HOSPADM

## 2023-02-08 RX ORDER — SODIUM CHLORIDE 0.9 % (FLUSH) 0.9 %
10 SYRINGE (ML) INJECTION EVERY 12 HOURS SCHEDULED
Status: CANCELLED | OUTPATIENT
Start: 2023-02-08

## 2023-02-08 RX ORDER — FAMOTIDINE 10 MG/ML
20 INJECTION, SOLUTION INTRAVENOUS ONCE
Status: CANCELLED | OUTPATIENT
Start: 2023-02-08 | End: 2023-02-08

## 2023-02-08 RX ORDER — ROCURONIUM BROMIDE 10 MG/ML
INJECTION, SOLUTION INTRAVENOUS AS NEEDED
Status: DISCONTINUED | OUTPATIENT
Start: 2023-02-08 | End: 2023-02-08 | Stop reason: SURG

## 2023-02-08 RX ORDER — IBUPROFEN 400 MG/1
400 TABLET ORAL EVERY 4 HOURS PRN
Status: DISCONTINUED | OUTPATIENT
Start: 2023-02-08 | End: 2023-02-11 | Stop reason: HOSPADM

## 2023-02-08 RX ORDER — ONDANSETRON 2 MG/ML
4 INJECTION INTRAMUSCULAR; INTRAVENOUS EVERY 6 HOURS PRN
Status: DISCONTINUED | OUTPATIENT
Start: 2023-02-08 | End: 2023-02-11 | Stop reason: HOSPADM

## 2023-02-08 RX ORDER — FENTANYL CITRATE 50 UG/ML
INJECTION, SOLUTION INTRAMUSCULAR; INTRAVENOUS
Status: COMPLETED
Start: 2023-02-08 | End: 2023-02-08

## 2023-02-08 RX ORDER — MAGNESIUM HYDROXIDE 1200 MG/15ML
LIQUID ORAL AS NEEDED
Status: DISCONTINUED | OUTPATIENT
Start: 2023-02-08 | End: 2023-02-08 | Stop reason: HOSPADM

## 2023-02-08 RX ORDER — FENTANYL CITRATE 50 UG/ML
INJECTION, SOLUTION INTRAMUSCULAR; INTRAVENOUS AS NEEDED
Status: DISCONTINUED | OUTPATIENT
Start: 2023-02-08 | End: 2023-02-08 | Stop reason: SURG

## 2023-02-08 RX ORDER — OXYCODONE HYDROCHLORIDE AND ACETAMINOPHEN 5; 325 MG/1; MG/1
1 TABLET ORAL EVERY 4 HOURS PRN
Status: DISCONTINUED | OUTPATIENT
Start: 2023-02-08 | End: 2023-02-11 | Stop reason: HOSPADM

## 2023-02-08 RX ORDER — NALOXONE HCL 0.4 MG/ML
0.1 VIAL (ML) INJECTION
Status: DISCONTINUED | OUTPATIENT
Start: 2023-02-08 | End: 2023-02-11 | Stop reason: HOSPADM

## 2023-02-08 RX ORDER — ENOXAPARIN SODIUM 100 MG/ML
40 INJECTION SUBCUTANEOUS DAILY
Status: DISCONTINUED | OUTPATIENT
Start: 2023-02-09 | End: 2023-02-11 | Stop reason: HOSPADM

## 2023-02-08 RX ORDER — OXYCODONE HYDROCHLORIDE AND ACETAMINOPHEN 5; 325 MG/1; MG/1
2 TABLET ORAL EVERY 4 HOURS PRN
Status: DISCONTINUED | OUTPATIENT
Start: 2023-02-08 | End: 2023-02-11 | Stop reason: HOSPADM

## 2023-02-08 RX ORDER — NALOXONE HCL 0.4 MG/ML
0.4 VIAL (ML) INJECTION
Status: DISCONTINUED | OUTPATIENT
Start: 2023-02-08 | End: 2023-02-11 | Stop reason: HOSPADM

## 2023-02-08 RX ORDER — SODIUM CHLORIDE 0.9 % (FLUSH) 0.9 %
10 SYRINGE (ML) INJECTION AS NEEDED
Status: CANCELLED | OUTPATIENT
Start: 2023-02-08

## 2023-02-08 RX ADMIN — FENTANYL CITRATE 50 MCG: 50 INJECTION, SOLUTION INTRAMUSCULAR; INTRAVENOUS at 18:56

## 2023-02-08 RX ADMIN — PROPOFOL 150 MG: 10 INJECTION, EMULSION INTRAVENOUS at 14:55

## 2023-02-08 RX ADMIN — FAMOTIDINE 20 MG: 20 TABLET, FILM COATED ORAL at 11:19

## 2023-02-08 RX ADMIN — SCOPALAMINE 1 PATCH: 1 PATCH, EXTENDED RELEASE TRANSDERMAL at 11:19

## 2023-02-08 RX ADMIN — FENTANYL CITRATE 50 MCG: 50 INJECTION, SOLUTION INTRAMUSCULAR; INTRAVENOUS at 19:05

## 2023-02-08 RX ADMIN — FENTANYL CITRATE 100 MCG: 50 INJECTION, SOLUTION INTRAMUSCULAR; INTRAVENOUS at 14:55

## 2023-02-08 RX ADMIN — GLYCOPYRROLATE 0.2 MCG: 0.2 INJECTION INTRAMUSCULAR; INTRAVENOUS at 15:34

## 2023-02-08 RX ADMIN — EPHEDRINE SULFATE 10 MG: 50 INJECTION INTRAVENOUS at 16:22

## 2023-02-08 RX ADMIN — ROCURONIUM BROMIDE 10 MG: 10 INJECTION, SOLUTION INTRAVENOUS at 17:21

## 2023-02-08 RX ADMIN — PROPOFOL 25 MCG/KG/MIN: 10 INJECTION, EMULSION INTRAVENOUS at 15:01

## 2023-02-08 RX ADMIN — EPHEDRINE SULFATE 10 MG: 50 INJECTION INTRAVENOUS at 15:05

## 2023-02-08 RX ADMIN — HYDROMORPHONE HYDROCHLORIDE 0.5 MG: 1 INJECTION, SOLUTION INTRAMUSCULAR; INTRAVENOUS; SUBCUTANEOUS at 19:42

## 2023-02-08 RX ADMIN — OXYCODONE HYDROCHLORIDE AND ACETAMINOPHEN 2 TABLET: 5; 325 TABLET ORAL at 20:37

## 2023-02-08 RX ADMIN — SUGAMMADEX 200 MG: 100 INJECTION, SOLUTION INTRAVENOUS at 18:31

## 2023-02-08 RX ADMIN — Medication 0.5 MG: at 19:42

## 2023-02-08 RX ADMIN — LIDOCAINE HYDROCHLORIDE 0.5 ML: 10 INJECTION, SOLUTION EPIDURAL; INFILTRATION; INTRACAUDAL; PERINEURAL at 11:19

## 2023-02-08 RX ADMIN — ONDANSETRON 4 MG: 2 INJECTION INTRAMUSCULAR; INTRAVENOUS at 17:24

## 2023-02-08 RX ADMIN — GABAPENTIN 600 MG: 300 CAPSULE ORAL at 11:19

## 2023-02-08 RX ADMIN — ROCURONIUM BROMIDE 20 MG: 10 INJECTION, SOLUTION INTRAVENOUS at 18:01

## 2023-02-08 RX ADMIN — CEFAZOLIN SODIUM 2 G: 2 INJECTION, SOLUTION INTRAVENOUS at 15:06

## 2023-02-08 RX ADMIN — KETOROLAC TROMETHAMINE 30 MG: 30 INJECTION, SOLUTION INTRAMUSCULAR; INTRAVENOUS at 18:29

## 2023-02-08 RX ADMIN — ROCURONIUM BROMIDE 50 MG: 10 INJECTION, SOLUTION INTRAVENOUS at 14:56

## 2023-02-08 RX ADMIN — ENOXAPARIN SODIUM 40 MG: 100 INJECTION SUBCUTANEOUS at 14:58

## 2023-02-08 RX ADMIN — SODIUM CHLORIDE, POTASSIUM CHLORIDE, SODIUM LACTATE AND CALCIUM CHLORIDE 9 ML/HR: 600; 310; 30; 20 INJECTION, SOLUTION INTRAVENOUS at 11:42

## 2023-02-08 RX ADMIN — DEXAMETHASONE SODIUM PHOSPHATE 4 MG: 4 INJECTION, SOLUTION INTRAMUSCULAR; INTRAVENOUS at 15:06

## 2023-02-08 RX ADMIN — HYDROMORPHONE HYDROCHLORIDE 1 MG: 1 INJECTION, SOLUTION INTRAMUSCULAR; INTRAVENOUS; SUBCUTANEOUS at 23:23

## 2023-02-08 RX ADMIN — LIDOCAINE HYDROCHLORIDE 50 MG: 10 INJECTION, SOLUTION EPIDURAL; INFILTRATION; INTRACAUDAL; PERINEURAL at 14:55

## 2023-02-08 RX ADMIN — GLYCOPYRROLATE 0.2 MCG: 0.2 INJECTION INTRAMUSCULAR; INTRAVENOUS at 14:54

## 2023-02-08 RX ADMIN — ROCURONIUM BROMIDE 20 MG: 10 INJECTION, SOLUTION INTRAVENOUS at 16:30

## 2023-02-08 RX ADMIN — SODIUM CHLORIDE, POTASSIUM CHLORIDE, SODIUM LACTATE AND CALCIUM CHLORIDE 125 ML/HR: 600; 310; 30; 20 INJECTION, SOLUTION INTRAVENOUS at 20:39

## 2023-02-08 RX ADMIN — ACETAMINOPHEN 1000 MG: 500 TABLET ORAL at 11:19

## 2023-02-08 NOTE — ANESTHESIA POSTPROCEDURE EVALUATION
Patient: Jeane Escalante    Procedure Summary     Date: 02/08/23 Room / Location:  [facility] /  [facility]    Anesthesia Start: 1445 Anesthesia Stop: 1854    Procedure: VAGINAL HYSTERECTOMY BILATERAL SALPINGECTOMY, UTEROSACRAL LIGAMENT SUSPENSION ANTERIOR AND POSTERIOR COLPORRHAPHY AND PERINEORRHAPHY (Uterus) Diagnosis:       Prolapse of anterior vaginal wall      (Prolapse of anterior vaginal wall [N81.10])    Surgeons: Esthela Jay MD Provider: Ronni Fried MD    Anesthesia Type: general ASA Status: 2          Anesthesia Type: general    Vitals  Vitals Value Taken Time   BP     Temp     Pulse 106 02/08/23 1853   Resp     SpO2 87 % 02/08/23 1853   Vitals shown include unvalidated device data.        Post Anesthesia Care and Evaluation    Patient location during evaluation: PACU  Patient participation: complete - patient participated  Level of consciousness: awake and alert  Pain management: adequate    Airway patency: patent  Anesthetic complications: No anesthetic complications  PONV Status: none  Cardiovascular status: hemodynamically stable and acceptable  Respiratory status: nonlabored ventilation, acceptable and nasal cannula  Hydration status: acceptable

## 2023-02-08 NOTE — OP NOTE
OPERATIVE NOTE     Darrell Escalante  : 1975  MRN: [id number]  CSN: [id number]  Date: 2023      Pre-op Diagnosis:  1. Grade 2-3 anterior vaginal wall prolapse   2. Grade 1-2 posterior vaginal wall prolapse   3. Grade 1-2 apical uterine prolapse    Post-op Diagnosis:  1. Same as above    Procedure:  TVH, BS, anterior and posterior colporrhaphy, perineorrhaphy, and uterosacral ligament suspension   Surgeon: Esthela Jay MD    Assist: Felipe Ramirez MD   Anesthesia: General   Estimated Blood Loss: 200 mls   Fluids: 1700 mls   UOP: 580 mls   ABx: cefazolin 2 gms   Specimens:  Uterus, cervix, bilateral fallopian tubes    Findings:  1. Normal external female genitalia; uterus small, mobile, approximately 6 weeks in size.  No adnexal masses or fullness appreciated  2.  Normal-appearing uterus, cervix, bilateral fallopian tubes, bilateral ovaries.  3.  Grade 2 anterior, grade 2 apical, grade 2-3 posterior vaginal prolapse.    Complications:  none        Description of Procedure   After review of informed consent, the patient was taken to the operating room where general anesthesia was administered without complication and found to be adequate.  The patient was then placed in the dorsal lithotomy position with leg supported in candycane stirrups.  Exam under anesthesia was performed, which demonstrated normal external female genitalia. Grade 2 anterior, grade 2 apical and grade 2-3 posterior vaginal prolapse was also appreciated. The uterus was anteverted, mobile, and approximately 6 weeks in size.  No adnexal masses or fullness was  appreciated.  The perineum was then prepped and draped in normal sterile fashion and a Mcginnis catheter was placed.    A weighted speculum and Faye retractor were then placed into the vagina and the cervix was visualized.  The cervix was normal in appearance.  Hazel thyroid tenaculum x2 were then placed on the anterior and posterior lips of the cervix.  A circumferential  incision was then made around the surface of the cervix with the Bovie. The posterior cul-de-sac was then entered by grasping the posterior vagina with pickups and then entered sharply with Servin scissors.  A single stitch was then placed to hold the posterior peritoneum and posterior lip of the vagina in the midline.  The bladder was then dissected off the cervix anteriorly with blunt and sharp dissection until the anterior peritoneum was reached.  The anterior peritoneum was then grasped with pickups and entered sharply with Metzenbaum scissors.  This incision was extended with good visualization of the abdominal contents.      The uterosacral ligaments were then clamped, cut, and suture-ligated using #0 Vicryl.  The cardinal ligaments were then clamped, cut, and ligated using the Enseal device.  A single, right lateral artery was noted to be bleeding, and two ties on a pass were then placed with excellent hemostasis.  The uterine arteries were then clamped, cut, and ligated using the Enseal device.  The Enseal device was then placed serially along the lateral edge of the uterus, inside the prior pedicles, where these pedicles were cut and suture-ligated until the uterine cornua was reached.  The uterus was then rotated through the vaginal opening, and the fallopian tubes were then removed with the Enseal device.  The utero-ovarian pedicles were then clamped, cut, and ligated using the Enseal device.  The uterus and cervix were then removed.  All pedicles were then inspected and excellent hemostasis was again noted.  Bilateral fallopian tubes and ovaries were again noted to be normal in appearance.    The peritoneum was then closed with 0 Vicryl in a pursestring fashion by Dr. Ramirez.  At this point Dr. Ramirez then took over the remainder of the case for the prolapse repair.  Please see his note for repair and vaginal cuff closure details.    Dr. Ramirez was responsible for performing the following activities:  Retraction, Suction, Irrigation, Suturing and Closing, and all prolapse repair portions of the case. His skilled assistance was necessary for the success of this case.      Esthela Jay MD   2/8/2023  18:46 EST

## 2023-02-08 NOTE — ANESTHESIA PROCEDURE NOTES
Airway  Urgency: elective    Date/Time: 2/8/2023 2:57 PM  Airway not difficult    General Information and Staff    Patient location during procedure: OR  SRNA: Jumping Branch, Kimberly, SRNA  Indications and Patient Condition  Indications for airway management: airway protection    Preoxygenated: yes  MILS maintained throughout  Mask difficulty assessment: 0 - not attempted    Final Airway Details  Final airway type: endotracheal airway      Successful airway: ETT  Cuffed: yes   Successful intubation technique: direct laryngoscopy  Facilitating devices/methods: intubating stylet  Blade: Batista  Blade size: 2  ETT size (mm): 7.0  Cormack-Lehane Classification: grade I - full view of glottis  Placement verified by: chest auscultation and capnometry   Cuff volume (mL): 7  Measured from: teeth  ETT/EBT  to teeth (cm): 20  Number of attempts at approach: 1  Assessment: lips, teeth, and gum same as pre-op and atraumatic intubation

## 2023-02-08 NOTE — ANESTHESIA PREPROCEDURE EVALUATION
Anesthesia Evaluation     Patient summary reviewed and Nursing notes reviewed   no history of anesthetic complications:  NPO Solid Status: > 8 hours  NPO Liquid Status: > 8 hours           Airway   Mallampati: II  TM distance: >3 FB  Neck ROM: full  No difficulty expected  Dental      Pulmonary - negative pulmonary ROS and normal exam   Cardiovascular - normal exam        Neuro/Psych  (+) psychiatric history,    GI/Hepatic/Renal/Endo      Musculoskeletal     Abdominal    Substance History      OB/GYN          Other                        Anesthesia Plan    ASA 2     general     intravenous induction     Anesthetic plan, risks, benefits, and alternatives have been provided, discussed and informed consent has been obtained with: patient.        CODE STATUS:

## 2023-02-09 LAB
BASOPHILS # BLD AUTO: 0.05 10*3/MM3 (ref 0–0.2)
BASOPHILS NFR BLD AUTO: 0.4 % (ref 0–1.5)
DEPRECATED RDW RBC AUTO: 42.2 FL (ref 37–54)
EOSINOPHIL # BLD AUTO: 0.01 10*3/MM3 (ref 0–0.4)
EOSINOPHIL NFR BLD AUTO: 0.1 % (ref 0.3–6.2)
ERYTHROCYTE [DISTWIDTH] IN BLOOD BY AUTOMATED COUNT: 12.7 % (ref 12.3–15.4)
HCT VFR BLD AUTO: 34.7 % (ref 34–46.6)
HGB BLD-MCNC: 11.5 G/DL (ref 12–15.9)
IMM GRANULOCYTES # BLD AUTO: 0.07 10*3/MM3 (ref 0–0.05)
IMM GRANULOCYTES NFR BLD AUTO: 0.5 % (ref 0–0.5)
LYMPHOCYTES # BLD AUTO: 2.69 10*3/MM3 (ref 0.7–3.1)
LYMPHOCYTES NFR BLD AUTO: 19.3 % (ref 19.6–45.3)
MCH RBC QN AUTO: 30.3 PG (ref 26.6–33)
MCHC RBC AUTO-ENTMCNC: 33.1 G/DL (ref 31.5–35.7)
MCV RBC AUTO: 91.6 FL (ref 79–97)
MONOCYTES # BLD AUTO: 0.98 10*3/MM3 (ref 0.1–0.9)
MONOCYTES NFR BLD AUTO: 7 % (ref 5–12)
NEUTROPHILS NFR BLD AUTO: 10.11 10*3/MM3 (ref 1.7–7)
NEUTROPHILS NFR BLD AUTO: 72.7 % (ref 42.7–76)
NRBC BLD AUTO-RTO: 0 /100 WBC (ref 0–0.2)
PLATELET # BLD AUTO: 235 10*3/MM3 (ref 140–450)
PMV BLD AUTO: 11 FL (ref 6–12)
RBC # BLD AUTO: 3.79 10*6/MM3 (ref 3.77–5.28)
WBC NRBC COR # BLD: 13.91 10*3/MM3 (ref 3.4–10.8)

## 2023-02-09 PROCEDURE — 85025 COMPLETE CBC W/AUTO DIFF WBC: CPT | Performed by: OBSTETRICS & GYNECOLOGY

## 2023-02-09 PROCEDURE — 25010000002 ENOXAPARIN PER 10 MG: Performed by: OBSTETRICS & GYNECOLOGY

## 2023-02-09 PROCEDURE — 25010000002 HYDROMORPHONE 1 MG/ML SOLUTION: Performed by: OBSTETRICS & GYNECOLOGY

## 2023-02-09 RX ORDER — POLYETHYLENE GLYCOL 3350 17 G/17G
17 POWDER, FOR SOLUTION ORAL DAILY
Status: DISCONTINUED | OUTPATIENT
Start: 2023-02-09 | End: 2023-02-11 | Stop reason: HOSPADM

## 2023-02-09 RX ADMIN — POLYETHYLENE GLYCOL 3350 17 G: 17 POWDER, FOR SOLUTION ORAL at 10:42

## 2023-02-09 RX ADMIN — OXYCODONE HYDROCHLORIDE AND ACETAMINOPHEN 2 TABLET: 5; 325 TABLET ORAL at 15:34

## 2023-02-09 RX ADMIN — HYDROMORPHONE HYDROCHLORIDE 1 MG: 1 INJECTION, SOLUTION INTRAMUSCULAR; INTRAVENOUS; SUBCUTANEOUS at 16:23

## 2023-02-09 RX ADMIN — OXYCODONE HYDROCHLORIDE AND ACETAMINOPHEN 2 TABLET: 5; 325 TABLET ORAL at 19:21

## 2023-02-09 RX ADMIN — OXYCODONE HYDROCHLORIDE AND ACETAMINOPHEN 2 TABLET: 5; 325 TABLET ORAL at 06:53

## 2023-02-09 RX ADMIN — OXYCODONE HYDROCHLORIDE AND ACETAMINOPHEN 2 TABLET: 5; 325 TABLET ORAL at 23:33

## 2023-02-09 RX ADMIN — OXYCODONE HYDROCHLORIDE AND ACETAMINOPHEN 2 TABLET: 5; 325 TABLET ORAL at 01:18

## 2023-02-09 RX ADMIN — HYDROMORPHONE HYDROCHLORIDE 1 MG: 1 INJECTION, SOLUTION INTRAMUSCULAR; INTRAVENOUS; SUBCUTANEOUS at 20:54

## 2023-02-09 RX ADMIN — OXYCODONE HYDROCHLORIDE AND ACETAMINOPHEN 2 TABLET: 5; 325 TABLET ORAL at 10:42

## 2023-02-09 NOTE — PLAN OF CARE
Problem: Adult Inpatient Plan of Care  Goal: Plan of Care Review  Outcome: Ongoing, Progressing  Flowsheets  Taken 2/9/2023 1428 by Slade Webb, RN  Progress: improving  Outcome Evaluation: VSS. REsting well. No complaints. PRN PO for pain. DTV. Mcginnis removed, pt still hasn't voided. Residuals less than 200ml at this time. Walking in the halls. Continue care.  Taken 2/8/2023 1739 by Bebe Anderson, RN  Plan of Care Reviewed With: patient   Goal Outcome Evaluation:           Progress: improving  Outcome Evaluation: VSS. REsting well. No complaints. PRN PO for pain. DTV. Mcginnis removed, pt still hasn't voided. Residuals less than 200ml at this time. Walking in the halls. Continue care.

## 2023-02-09 NOTE — OP NOTE
OPERATIVE NOTE     Darrell Escalante  : 1975  MRN: [id number]  CSN: [id number]  Date: 2023      Pre-op Diagnosis:  1. Symptomatic cystocele  2. Symptomatic rectocele with perineal seal  3. Vaginal vault descensus   Post-op Diagnosis:  1. Same   Procedure: 1. Anterior colpoperineorrhaphy  2. Posterior colpoperineorrhaphy  3. Vaginal vault suspension with uterosacral ligament   Surgeon: Felipe Ramirez M.D.   Assist: Esthela Jay  was responsible for performing the following activities: Retraction, Suction and Irrigation and their skilled assistance was necessary for the success of this case.   Anesthesia: General   Estimated Blood Loss: <100 mls   Fluids: 1000 mls   UOP: 200 mls   Findings: Midline cystocele - grade 2/3  Midline rectocele with perinocele   Complications: none       Description of Procedure   After Dr. Jay finished performing the vaginal hysterectomy along with bilateral salpingectomy, I took over for the repair.  I began by performing a high pursestring closure of the peritoneal cavity.  Angle sutures were placed independently incorporating the vaginal angle and the distal uterosacral ligaments.  Prior to closing the peritoneum, vaginal vault suspension sutures were placed ipsilaterally involving the vaginal apex at 6:00 and 10:00 and the intraperitoneal portion of the uterosacral ligament.  Those were held for tying at the appropriate time of the case.    Posterior vagina was split in the midline from the 12 o'clock position to a point 2 cm just below the urethral meatus.  The underlying pubocervical fascia was bluntly and sharply dissected free from the overlying vaginal epithelium.  There was a midline defect involving the pubocervical fascia.  This was plicated in the midline through a series of interrupted Vicryl sutures.  The repair commenced from the suburethral portion up to the vaginal apex as delineated by the previously held uterosacral ligaments.  After reducing  the cystocele small amount of vaginal epithelium was trimmed and the vagina was closed in a continuous running locking fashion.  Vaginal cuff was next closed from both left and right corners independently tying in the midline.    V-shaped incision was made in the perineum and the posterior vagina was split from the 6 o'clock position up to a point approximately 2 cm from the closed vaginal cuff.  Using both blunt and sharp dissection the underlying rectovaginal fascia was dissected off of the vaginal epithelium.  A prominent midline deficit was seen in the rectovaginal fascia.  This was plicated to the midline through a series of interrupted Vicryl sutures.  Vaginal epithelium was closed in a continuous running locking fashion down to the hymeneal ring.  Interrupted Vicryl sutures were used to recreate the perineal body.  Perineal skin was reapproximated with Vicryl suture.  The previously held vaginal vault sutures were now tied elevating the vaginal apex.  Following the completion of this the vaginal length greater than 10 cm.  Vaginal introitus was of normal caliber and the axis of the vagina was correctly positioned toward the hollow of the sacrum.  There was no undue ridging in the vagina.    Premarin soaked gauze was placed into the vagina.  She was awakened and taken to recovery in stable condition.  All counts were correct and there was no complications.    Felipe Ramirez MD   2/8/2023  19:35 EST

## 2023-02-09 NOTE — BRIEF OP NOTE
VAGINAL HYSTERECTOMY WITH ANTERIOR AND POSTERIOR VAGINAL REPAIR  Progress Note    Jeane Escalante  2/8/2023    Pre-op Diagnosis:   Prolapse of anterior vaginal wall [N81.10]       Post-Op Diagnosis Codes:     * Prolapse of anterior vaginal wall [N81.10]    Procedure/CPT® Codes    Procedure(s):  VAGINAL HYSTERECTOMY BILATERAL SALPINGECTOMY, UTEROSACRAL LIGAMENT SUSPENSION ANTERIOR AND POSTERIOR COLPORRHAPHY AND PERINEORRHAPHY      Surgeon(s):  Esthela Jay MD Youkilis, Bradley B, MD    Anesthesia: General    Staff:   Circulator: Raymond Bledsoe, MARY; Bebe Anderson, MARY; Rivka June, MARY; Ngozi Cash, MARY  Physician Assistant: Kenna Morgan PA  Scrub Person: Saira Stauffer; Tiffani Lynch; Grace Huertas  Nursing Assistant: Hemal Lanier PCT; Lorri Mcnally A         Estimated Blood Loss: 200 mL    Urine Voided: 580 mL    Specimens:                          Drains: * No LDAs found *    Findings:    1. Normal external female genitalia; uterus small, mobile, approximately 6 weeks in size.  No adnexal masses or fullness appreciated  2.  Normal-appearing uterus, cervix, bilateral fallopian tubes, bilateral ovaries.  3.  Grade 2 anterior, grade 2 apical, grade 2-3 posterior vaginal prolapse.       Complications: none          Esthela Jay MD     Date: 2/8/2023  Time: 19:00 EST

## 2023-02-09 NOTE — PLAN OF CARE
Problem: Adult Inpatient Plan of Care  Goal: Optimal Comfort and Wellbeing  Intervention: Monitor Pain and Promote Comfort  Recent Flowsheet Documentation  Taken 2/9/2023 0118 by Kendra Gómez, RN  Pain Management Interventions: see MAR  Taken 2/8/2023 2037 by Kendra Gómez RN  Pain Management Interventions: see MAR  Intervention: Provide Person-Centered Care  Recent Flowsheet Documentation  Taken 2/8/2023 2037 by Kendra Gómez, RN  Trust Relationship/Rapport:   care explained   choices provided   reassurance provided   thoughts/feelings acknowledged   Goal Outcome Evaluation:   Pt is A&OX4 with VSS and frequent c/p ABD pain. SCUDS, incentive, FC in place. IF fluids stopped. Advanced diet to regular. Will CTM and provide care.

## 2023-02-09 NOTE — PROGRESS NOTES
HAJA Ramírez  Jeane Escalante  : 1975  MRN: [id number]  CSN: [id number]    Hospital Day: 2    CC: hospital follow-up    Post-operative Day #1  Subjective   Her pain is well controlled.  She really wants to leave the hospital to smoke and declines efforts for transdermal nicotine replacement.     Objective     Min/max vitals past 24 hours:   Temp  Min: 97.6 °F (36.4 °C)  Max: 98.6 °F (37 °C)  BP  Min: 93/53  Max: 124/76  Pulse  Min: 62  Max: 101  Resp  Min: 16  Max: 18        I/O last 3 completed shifts:  In: 1502.6 [P.O.:475; I.V.:1027.6]  Out: 1380 [Urine:1180; Blood:200]    General: well developed; well nourished  no acute distress   Abdomen: soft, non-tender; no masses  no umbilical or inguinal hernias are present  no hepato-splenomegaly   Pelvic: Not performed   Ext: Calves NT     Last 3 values   Results from last 7 days   Lab Units 23  0634 23  2044 23  1618   WBC 10*3/mm3 13.91* 14.16* 10.09   HEMOGLOBIN g/dL 11.5* 12.8 14.9   HEMATOCRIT % 34.7 38.0 45.4   PLATELETS 10*3/mm3 235 250 274          Assessment   1. Post-op Day #1 S/P transvaginal hysterectomy with bilateral salpingectomy; vaginal vault suspension with uterosacral ligament; anterior and posterior colpoperineorrhaphy  2. Nicotine withdrawal     Plan   1. Explained traditionally have remove catheter on postop day 2 due to increased risks of urinary retention.  She is adamant about trying to leave today.  I told her we will try to remove catheter and check residual urines.  If she is able to urinate well and otherwise tolerating diet and pain, may be able to discharge later today  2. Explained to patient that hospital medicine no smoking policy which includes outside on the hospital grounds.  Have offered nicotine replacement but she adamantly declines this    Felipe Ramirez MD  2023  08:44 EST

## 2023-02-09 NOTE — NURSING NOTE
RN discussed that it is now 6 hours post jay removal. MD advised to I/o cath pt 1x. Pt is refusing I/o cath. Most recent bladder scan showing 168ml. Pt now c/o of increased pain around rectal area. Gave IV PRN once for fist time today. Pt also revealed she hasn't been drinking as much fluids as RN previously believed.

## 2023-02-10 PROCEDURE — 25010000002 HYDROMORPHONE 1 MG/ML SOLUTION: Performed by: OBSTETRICS & GYNECOLOGY

## 2023-02-10 RX ADMIN — OXYCODONE HYDROCHLORIDE AND ACETAMINOPHEN 2 TABLET: 5; 325 TABLET ORAL at 13:16

## 2023-02-10 RX ADMIN — POLYETHYLENE GLYCOL 3350 17 G: 17 POWDER, FOR SOLUTION ORAL at 08:20

## 2023-02-10 RX ADMIN — HYDROMORPHONE HYDROCHLORIDE 1 MG: 1 INJECTION, SOLUTION INTRAMUSCULAR; INTRAVENOUS; SUBCUTANEOUS at 05:19

## 2023-02-10 RX ADMIN — HYDROMORPHONE HYDROCHLORIDE 1 MG: 1 INJECTION, SOLUTION INTRAMUSCULAR; INTRAVENOUS; SUBCUTANEOUS at 01:08

## 2023-02-10 RX ADMIN — OXYCODONE HYDROCHLORIDE AND ACETAMINOPHEN 2 TABLET: 5; 325 TABLET ORAL at 03:39

## 2023-02-10 RX ADMIN — OXYCODONE HYDROCHLORIDE AND ACETAMINOPHEN 2 TABLET: 5; 325 TABLET ORAL at 08:18

## 2023-02-10 RX ADMIN — IBUPROFEN 400 MG: 400 TABLET, FILM COATED ORAL at 14:09

## 2023-02-10 RX ADMIN — OXYCODONE HYDROCHLORIDE AND ACETAMINOPHEN 2 TABLET: 5; 325 TABLET ORAL at 17:02

## 2023-02-10 RX ADMIN — OXYCODONE HYDROCHLORIDE AND ACETAMINOPHEN 2 TABLET: 5; 325 TABLET ORAL at 21:58

## 2023-02-10 NOTE — CASE MANAGEMENT/SOCIAL WORK
Discharge Planning Assessment  [facility]     Patient Name: Jeane Escalante  MRN: [id number]  Today's Date: 2/10/2023    Admit Date: 2/8/2023    Plan: Home   Discharge Needs Assessment     Row Name 02/10/23 1223       Living Environment    People in Home alone    Current Living Arrangements home    Primary Care Provided by [facility]    Quality of Family Relationships supportive    Able to Return to Prior Arrangements yes       Resource/Environmental Concerns    Resource/Environmental Concerns none    Transportation Concerns none       Transition Planning    Patient/Family Anticipates Transition to home    Patient/Family Anticipated Services at Transition none    Transportation Anticipated family or friend will provide       Discharge Needs Assessment    Readmission Within the Last 30 Days no previous admission in last 30 days    Equipment Currently Used at Home none    Concerns to be Addressed no discharge needs identified    Anticipated Changes Related to Illness none    Equipment Needed After Discharge none    Provided Post Acute Provider List? N/A    N/A Provider List Comment Does not anticipate any discharge needs at this time.    Provided Post Acute Provider Quality & Resource List? N/A               Discharge Plan     Row Name 02/10/23 1224       Plan    Plan Home    Provided Post Acute Provider List? N/A    Provided Post Acute Provider Quality & Resource List? N/A    Plan Comments MSW spoke with pt at bedside. Pt is independent in ADL's and IADL's. Pt has prescription coverage with her insurance. Pt's PCP is Luz Reyes. Pt reports she will have assistance at home from family if needed. Pt had no discharge needs or concerns at this time. Pt plans to discharge home when medically ready.    Final Discharge Disposition Code 01 - home or self-care              Continued Care and Services - Admitted Since 2/8/2023    Coordination has not been started for this encounter.       Expected Discharge Date and Time      Expected Discharge Date Expected Discharge Time    Feb 10, 2023          Demographic Summary     Row Name 02/10/23 1222       General Information    Reason for Consult discharge planning               Functional Status     Row Name 02/10/23 1223       Functional Status, IADL    Medications independent    Meal Preparation independent    Housekeeping independent    Laundry independent    Shopping independent               Psychosocial    No documentation.                Abuse/Neglect    No documentation.                Legal    No documentation.                Substance Abuse    No documentation.                Patient Forms    No documentation.                   SASHA Cintron

## 2023-02-10 NOTE — PROGRESS NOTES
HAJA Ramírez  Jeane Escalante  : 1975  MRN: [id number]  CSN: [id number]    Hospital Day: 3    CC: hospital follow-up    Post-operative Day #2  Subjective   Her pain is well controlled. She is passing gas.  Last night she had to have her Mcginnis catheter we anchored.  Much of today she has been refusing intermittent self-catheterization or bladder scans.  She did have a bladder scan earlier this afternoon with about 500 cc in.  She was cathed for about 600 cc.     Objective     Min/max vitals past 24 hours:   Temp  Min: 97.5 °F (36.4 °C)  Max: 98.3 °F (36.8 °C)  BP  Min: 95/56  Max: 113/72  Pulse  Min: 50  Max: 75  Resp  Min: 14  Max: 16        I/O last 3 completed shifts:  In: 1875 [P.O.:1675; Other:200]  Out: 1750 [Urine:1750]    General: well developed; well nourished  no acute distress   Abdomen: soft, non-tender; no masses  no umbilical or inguinal hernias are present  no hepato-splenomegaly   Pelvic: Not performed   Ext: Calves NT     Last 3 values   Results from last 7 days   Lab Units 02//23  0634 02//  2044   WBC 10*3/mm3 13.91* 14.16*   HEMOGLOBIN g/dL 11.5* 12.8   HEMATOCRIT % 34.7 38.0   PLATELETS 10*3/mm3 235 250          Assessment   1. Post-op Day #1 S/P TVH with bilateral salpingectomy; anterior and posterior colpoperineorrhaphy with vaginal vault suspension with uterosacral ligaments     Plan   1. Have emphasized the importance of ongoing surveillance of bladder volumes and the need to either be taught intermittent self-catheterization will be able to void spontaneously with minimal residual urines  2. Continue to emphasize the need to self cath and have reassured her that almost always this is a very short-term issue.  3. I told her that sending home with an indwelling Mcginnis catheter is an option but it would need to stay in for at least a week.  After that point if there is issues with urinary retention, self-catheterization would need to be alarmed.    Felipe Ramirez,  MD  2/10/2023  16:41 EST

## 2023-02-10 NOTE — PLAN OF CARE
Goal Outcome Evaluation:  Plan of Care Reviewed With: patient        Progress: improving  Outcome Evaluation: Pt. rested throughout the night, c/o pain, PRN for pain given, reinserted jay per orders, incision c/d/i, will continue to monitor

## 2023-02-11 VITALS
TEMPERATURE: 98.2 F | DIASTOLIC BLOOD PRESSURE: 58 MMHG | RESPIRATION RATE: 17 BRPM | SYSTOLIC BLOOD PRESSURE: 89 MMHG | OXYGEN SATURATION: 96 % | HEART RATE: 55 BPM

## 2023-02-11 PROCEDURE — 25010000002 ENOXAPARIN PER 10 MG: Performed by: OBSTETRICS & GYNECOLOGY

## 2023-02-11 RX ORDER — IBUPROFEN 200 MG
200-600 TABLET ORAL EVERY 4 HOURS PRN
Start: 2023-02-11 | End: 2023-02-21

## 2023-02-11 RX ORDER — OXYCODONE HYDROCHLORIDE AND ACETAMINOPHEN 5; 325 MG/1; MG/1
1-2 TABLET ORAL EVERY 4 HOURS PRN
Qty: 20 TABLET | Refills: 0 | Status: SHIPPED | OUTPATIENT
Start: 2023-02-11 | End: 2023-02-16

## 2023-02-11 RX ADMIN — POLYETHYLENE GLYCOL 3350 17 G: 17 POWDER, FOR SOLUTION ORAL at 08:08

## 2023-02-11 RX ADMIN — OXYCODONE HYDROCHLORIDE AND ACETAMINOPHEN 2 TABLET: 5; 325 TABLET ORAL at 07:28

## 2023-02-11 RX ADMIN — ENOXAPARIN SODIUM 40 MG: 40 INJECTION SUBCUTANEOUS at 10:17

## 2023-02-11 RX ADMIN — OXYCODONE HYDROCHLORIDE AND ACETAMINOPHEN 2 TABLET: 5; 325 TABLET ORAL at 02:25

## 2023-02-11 RX ADMIN — IBUPROFEN 400 MG: 400 TABLET, FILM COATED ORAL at 10:17

## 2023-02-11 NOTE — DISCHARGE SUMMARY
Darrell   Jeane Escalante  : 1975  MRN: [id number]  CSN: [id number]    Discharge Summary      Date of Admission: 2023   Date of Discharge: 2023   Discharge Diagnoses: 1. Postop state status post transvaginal hysterectomy with bilateral salpingectomy, uterosacral suspension of vaginal vault with anterior and posterior colpoperineorrhaphy  2. Symptomatic pelvic organ prolapse   Procedures Performed: VAGINAL HYSTERECTOMY BILATERAL SALPINGECTOMY, UTEROSACRAL LIGAMENT SUSPENSION ANTERIOR AND POSTERIOR COLPORRHAPHY AND PERINEORRHAPHY   Consults: none   Brief History: Patient is a 47 y.o.  who presented for definitive surgery due to prolapse.  Please see separately completed history and physical for more complete details.   Hospital Course: She was admitted day of surgery and underwent the above-mentioned procedure.  She received both antibiotic and DVT prophylaxis.  Please see separately completed operative note for more complete details.    On the first postoperative day per patient request her Mcginnis catheter was discontinued.  She did have some difficulty with bladder emptying.  Intermittent catheterization was required and her bladder was drained by Mcginnis catheter that evening.  On the second postoperative day bladder function began to return.  And on the third postoperative day bladder function was much improved and she was meeting discharge criteria and ready for discharge   Pending Studies: none   Condition at discharge: stable   Discharge Medications:    Your medication list      START taking these medications      Instructions Last Dose Given Next Dose Due   ibuprofen 200 MG tablet  Commonly known as: ADVIL,MOTRIN      Take 1-3 tablets by mouth Every 4 (Four) Hours As Needed for Mild Pain for up to 10 days.       oxyCODONE-acetaminophen 5-325 MG per tablet  Commonly known as: Percocet      Take 1-2 tablets by mouth Every 4 (Four) Hours As Needed for Moderate Pain for up to 5 days.              Where to Get Your Medications      These medications were sent to [facility]  [address] [place] [address] [address], [facility] [address]    Hours: 7:00 AM-5:30 PM M-F, 8:00 AM-4:30 PM Sat-Sun Phone: [phone] ·   oxyCODONE-acetaminophen 5-325 MG per tablet     Information about where to get these medications is not yet available    Ask your nurse or doctor about these medications  · ibuprofen 200 MG tablet        Discharge Disposition: home   Follow-up: Future Appointments   Date Time Provider Department Center   8/11/2023 10:10 AM Esthela Jay MD [facility]            This note has been electronically signed.    Felipe Ramirez MD  February 11, 2023

## 2023-02-11 NOTE — PLAN OF CARE
Goal Outcome Evaluation:  Plan of Care Reviewed With: patient, spouse        Progress: improving  Outcome Evaluation: Ready for dc.

## 2023-02-11 NOTE — PROGRESS NOTES
HAJA Ramírez  Jeane Escalante  : 1975  MRN: [id number]  CSN: [id number]    Hospital Day: 4    CC: hospital follow-up    Post-operative Day #3  Subjective   Her pain is well controlled. She is passing gas. Her bowels are working normally.  She is now voiding without difficulty and would like to go home.     Objective     Min/max vitals past 24 hours:   Temp  Min: 97.4 °F (36.3 °C)  Max: 98.3 °F (36.8 °C)  BP  Min: 83/59  Max: 113/72  Pulse  Min: 53  Max: 65  Resp  Min: 16  Max: 17        I/O last 3 completed shifts:  In: 475 [P.O.:475]  Out: 2465 [Urine:2465]    General: well developed; well nourished  no acute distress   Abdomen: soft, non-tender; no masses  no umbilical or inguinal hernias are present  no hepato-splenomegaly   Pelvic: Not performed   Ext: Calves NT     Last 3 values   Results from last 7 days   Lab Units 23  0634 23  2044   WBC 10*3/mm3 13.91* 14.16*   HEMOGLOBIN g/dL 11.5* 12.8   HEMATOCRIT % 34.7 38.0   PLATELETS 10*3/mm3 235 250          Assessment   1. Post-op Day #3 S/P transvaginal hysterectomy with bilateral salpingectomy; anterior and posterior colpoperineorrhaphy; vaginal vault suspensions with uterosacral ligaments  2. Meeting discharge criteria with spontaneous voids with good objective evidence of bladder emptying by bladder scan     Plan   1. Discharge to home  2. Advised no tampons or intercourse for 6 weeks.  3. D/C questions all answered  4. Follow-up appointment in 1 month(s)  5. Lifting restrictions again reviewed    Felipe Ramirez MD  2023  10:00 EST

## 2023-02-11 NOTE — PLAN OF CARE
Goal Outcome Evaluation:  Plan of Care Reviewed With: patient        Progress: improving  Outcome Evaluation: Pt. rested throughout the night, c/o pain that is controlled with PRN, pt. voiding independently, will continue to monitor

## 2023-02-14 ENCOUNTER — TELEPHONE (OUTPATIENT)
Dept: OBSTETRICS AND GYNECOLOGY | Facility: CLINIC | Age: 48
End: 2023-02-14
Payer: COMMERCIAL

## 2023-02-14 LAB
CYTO UR: NORMAL
LAB AP CASE REPORT: NORMAL
LAB AP CLINICAL INFORMATION: NORMAL
LAB AP SPECIAL STAINS: NORMAL
PATH REPORT.FINAL DX SPEC: NORMAL
PATH REPORT.GROSS SPEC: NORMAL

## 2023-02-20 ENCOUNTER — TELEPHONE (OUTPATIENT)
Dept: OBSTETRICS AND GYNECOLOGY | Facility: CLINIC | Age: 48
End: 2023-02-20
Payer: COMMERCIAL

## 2023-02-20 ENCOUNTER — OFFICE VISIT (OUTPATIENT)
Dept: OBSTETRICS AND GYNECOLOGY | Facility: CLINIC | Age: 48
End: 2023-02-20
Payer: COMMERCIAL

## 2023-02-20 VITALS
WEIGHT: 148.4 LBS | SYSTOLIC BLOOD PRESSURE: 104 MMHG | RESPIRATION RATE: 14 BRPM | HEIGHT: 68 IN | DIASTOLIC BLOOD PRESSURE: 66 MMHG | BODY MASS INDEX: 22.49 KG/M2

## 2023-02-20 DIAGNOSIS — Z98.890 POST-OPERATIVE STATE: Primary | ICD-10-CM

## 2023-02-20 PROCEDURE — 99024 POSTOP FOLLOW-UP VISIT: CPT | Performed by: STUDENT IN AN ORGANIZED HEALTH CARE EDUCATION/TRAINING PROGRAM

## 2023-02-20 NOTE — PROGRESS NOTES
"Subjective   Chief Complaint   Patient presents with   • Post-op     c/o vaginal bleeding purplish color with odor, feels like there is a knot in the vaginal area, and very depressed.      Jeane Escalante is a 47 y.o. year old  presenting to be seen for her post-operative visit.  Currently she reports vaginal bleeding and feeling a \"knot\" in her vagina. She states the bleeding is light, and denies clotting. She reports the discoloration is a dark purple color. It also has a smell that is not fishy or foul. Denies trouble urinating or having a BM, and denies stool within the vagina.     She is also complaining of severe depression, however has continued to see her therapist and declines medications; denies SI/HI.     The results have previously been discussed with Jeane.      The following portions of the patient's history were reviewed and updated as appropriate:current medications and allergies       Objective   /66 (BP Location: Right arm, Patient Position: Sitting, Cuff Size: Adult)   Resp 14   Ht 172.7 cm (68\")   Wt 67.3 kg (148 lb 6.4 oz)   LMP  (LMP Unknown)   Breastfeeding No   BMI 22.56 kg/m²     General:  well developed; well nourished  no acute distress   Pelvis: Examined with partner Dr. Ramirez   Anterior compartment sutures in tact as well as vaginal cuff; excessive dark brown/black discharge with foul odor within vaginal vault;   Posterior compartment with evidence of prior ruptured hematoma causing suture separation - defect approximately 3-4cm in length and mucosal edges approximately 1cm apart. Rectal fascia remains in tact.      Final Diagnosis   UTERUS, CERVIX, AND BILATERAL FALLOPIAN TUBES, SIMPLE HYSTERECTOMY AND BILATERAL SALPINGECTOMIES:  Cervix:               Squamous and glandular mucosa with reactive changes.                Negative for atypia or malignancy.  Uterine Corpus:               Benign endometrium.               Adenomyosis.               Incidental leiomyoma.    "            Negative for atypia or malignancy.  Bilateral Fallopian Tubes:               No significant pathologic change.          Assessment   1. S/P TVH, BS, anterior and posterior colpoperineorrhaphy, vaginal vault suspension with uterosacral ligaments 2/8/23      Plan   1. Recommend increasing Sitz bath to 2 times per day and continued pelvic rest; defect most likely to heal on its own without surgical intervention.   2. Avoid tampons, douching and intercourse  3. The importance of keeping all planned follow-up and taking all medications as prescribed was emphasized.  4. Follow up in 3-4 weeks with Dr. Ramirez or sooner PRN     No orders of the defined types were placed in this encounter.         This note was electronically signed.    Esthela Jay MD .  February 20, 2023

## 2023-02-20 NOTE — TELEPHONE ENCOUNTER
"\"Bleeding is considered normal and will change consistency and color as she heals over the next 6 weeks, but we can see her this afternoon, that is fine\" - Dr. Jay    Called and informed patient, she verified understanding but would like to be seen today if possible.  Transferred call to Carola WALKER (Aspen PHILLIPS not available) to get patient scheduled accordingly.   "

## 2023-02-20 NOTE — TELEPHONE ENCOUNTER
Caller: Jeane Escalante     Relationship: SELF    Best call back number: [phone]    What is your medical concern? PT HAD HYSTERECTOMY AND HER BLADDER TACKED UP ON 2.8.23. SHE IS STILL BLEEDING FROM THAT SURGERY AND DOES NOT FEEL THAT SHOULD STILL BE HAPPENING. THE PT ALSO STATED THAT THE BLOOD COLOR HAS CHANGED TO A PURPLE/VIOLET/BROWN COLOR WITH AN ODOR. PLEASE LET HER KNOW WHAT SHE NEEDS TO DO AND HOW SOON YOU CAN SEE HER. SHE WOULD LIKE TO BE SEEN TODAY. SHE CANNOT DRIVE STILL SO IT WOULD BE AFTER 1. SHE IS ALSO HAVING SOME DEPRESSION ISSUES. PLEASE CALL PT AT ANY TIME AND YOU CAN LVM IF NEEDED.    How long has this issue been going on? SINCE SURGERY    Is your provider already aware of this issue? NO     Have you been treated for this issue? NO

## 2023-02-20 NOTE — TELEPHONE ENCOUNTER
Called and spoke with patient, she states that she does not have a fever.  She stated that she has not been feeling well the last few days, just like she does not have much energy/fatigued with depression. Bleeding concurs with below note.

## 2023-03-05 NOTE — PROGRESS NOTES
Subjective   Chief Complaint   Patient presents with   • Post-op     Jeane Escalante is a 48 y.o. year old  presenting to be seen for her post-operative visit.  Currently she reports no problems with eating, bowel movements, voiding, or wound drainage and pain is well controlled.  The previously discharge that she had reported has significantly improved.  As compared to prior to surgery her bowel and bladder function are much improved.    OTHER THINGS SHE WANTS TO DISCUSS TODAY:  Nothing else    The following portions of the patient's history were reviewed and updated as appropriate:current medications and allergies       Objective   /70   Resp 14   Wt 66.7 kg (147 lb)   LMP  (LMP Unknown)   BMI 22.35 kg/m²     General:  well developed; well nourished  no acute distress   Abdomen: soft, non-tender; no masses  no umbilical or inguinal hernias are present  no hepato-splenomegaly   Pelvis: Clinical staff was present for exam  External genitalia:  normal appearance of the external genitalia including Bartholin's and [place]'s glands.  :  urethral meatus normal;  Vaginal:  normal pink mucosa without prolapse or lesions. Suture lines are all intact.  Vaginal length and axes both are correct with length being greater than 9 cm  Cystocele GRADE 0  Rectocele GRADE 0          Assessment   1. S/P TVH with bilateral salpingectomy; anterior and posterior colpoperineorrhaphy; vaginal vault suspension with uterosacral ligaments 2023      Plan   1. Nothing per vagina still until 6 weeks after her procedure  2. Also stressed the importance of relative lifting restrictions for up to 6 months time and absolute lifting restrictions for 6 weeks from her procedure  3. The importance of keeping all planned follow-up and taking all medications as prescribed was emphasized.  4. Follow up in 2 more weeks to reassess .         This note was electronically signed.    Felipe Ramierz M.D.  2023    Part of this  note may be an electronic transcription/translation of spoken language to printed text using the Dragon Dictation System.

## 2023-03-09 ENCOUNTER — TELEPHONE (OUTPATIENT)
Dept: OBSTETRICS AND GYNECOLOGY | Facility: CLINIC | Age: 48
End: 2023-03-09

## 2023-03-09 ENCOUNTER — OFFICE VISIT (OUTPATIENT)
Dept: OBSTETRICS AND GYNECOLOGY | Facility: CLINIC | Age: 48
End: 2023-03-09
Payer: COMMERCIAL

## 2023-03-09 VITALS
WEIGHT: 147 LBS | RESPIRATION RATE: 14 BRPM | SYSTOLIC BLOOD PRESSURE: 110 MMHG | DIASTOLIC BLOOD PRESSURE: 70 MMHG | BODY MASS INDEX: 22.35 KG/M2

## 2023-03-09 DIAGNOSIS — Z98.890 POST-OPERATIVE STATE: Primary | ICD-10-CM

## 2023-03-09 PROCEDURE — 1159F MED LIST DOCD IN RCRD: CPT | Performed by: OBSTETRICS & GYNECOLOGY

## 2023-03-09 PROCEDURE — 99024 POSTOP FOLLOW-UP VISIT: CPT | Performed by: OBSTETRICS & GYNECOLOGY

## 2023-03-09 PROCEDURE — 1160F RVW MEDS BY RX/DR IN RCRD: CPT | Performed by: OBSTETRICS & GYNECOLOGY

## 2023-03-09 NOTE — TELEPHONE ENCOUNTER
Attempted to reach patient to find out exact date and if restrictions were being requested or not.  No answer and unable to LVM.

## 2023-03-13 ENCOUNTER — TELEPHONE (OUTPATIENT)
Dept: OBSTETRICS AND GYNECOLOGY | Facility: CLINIC | Age: 48
End: 2023-03-13

## 2023-03-13 PROBLEM — R87.612 LGSIL ON PAP SMEAR OF CERVIX: Status: RESOLVED | Noted: 2022-08-05 | Resolved: 2023-03-13

## 2023-03-13 NOTE — TELEPHONE ENCOUNTER
Caller: ANÍBAL KWON     Relationship to patient: SELF    Best call back number: [phone]    Patient is needing: CALLED ABOUT WORK RESTRICTIONS; NEEDING A CALL BACK.

## 2023-03-13 NOTE — TELEPHONE ENCOUNTER
Called and spoke with patient, informed her of physician  response. She verified understanding and stated that she would just leave everything as is for right now and discuss it with Dr. Ramirez at her appt next Friday.

## 2023-03-13 NOTE — TELEPHONE ENCOUNTER
Called and spoke with patient.  She states she would like to return to work starting 3/14/2023, but she would only want the return to work note if she would be able to do so with no restrictions. 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

## 2023-03-13 NOTE — TELEPHONE ENCOUNTER
She can return to work fully with no restrictions other than no lifting for 6 weeks following the procedure.  If she wants me to write that note I am happy to do it.

## 2023-03-18 NOTE — PROGRESS NOTES
Subjective   Chief Complaint   Patient presents with   • Post-op     Jeane Escalante is a 48 y.o. year old  presenting to be seen for her post-operative visit.  Currently she reports no problems with eating, bowel movements, voiding, or wound drainage and pain is well controlled.  She is urinating without any trouble.  At times she has some pulling on the left side in the vaginal region but it does not happen every day.  Sometime about a week ago she did have bloody bowel movement that was loose but other than that they have been normal.  She has had normal bowel movements since that time.  Vaginal drainage and discharge has fully resolved itself.    The pathology results from her procedure are in Jeane's record and are benign.      OTHER THINGS SHE WANTS TO DISCUSS TODAY:  Nothing else    The following portions of the patient's history were reviewed and updated as appropriate:current medications and allergies       Objective   /74   Resp 14   Wt 66.7 kg (147 lb)   LMP  (LMP Unknown)   BMI 22.35 kg/m²     General:  well developed; well nourished  no acute distress   Abdomen: soft, non-tender; no masses  no umbilical or inguinal hernias are present  no hepato-splenomegaly   Pelvis: Clinical staff was present for exam  External genitalia:  normal appearance of the external genitalia including Bartholin's and [place]'s glands.  :  urethral meatus normal;  Vaginal:  normal pink mucosa without prolapse or lesions. Caliber of the introitus is normal; length of the vagina is 10 cm with axis correctly placed; all suture lines are intact  Cystocele GRADE 0  Rectocele GRADE 0          Assessment   1. S/P TVH with bilateral salpingectomy; anterior and posterior colpoperineorrhaphy; vaginal vault suspension with uterosacral ligaments 2023 functionally and anatomically with very good result     Plan   1. May return to full activity with no restrictions  2. Once again reviewed with her that strength of the  surgical repair is still improving for up to 6 months time.  As such I really encouraged her to limit lifting for 6 months if possible  3. The importance of keeping all planned follow-up and taking all medications as prescribed was emphasized.  4. Follow up in 4 months time with Dr. Jay to reassess .         This note was electronically signed.    Felipe Ramirez M.D.  March 24, 2023    Part of this note may be an electronic transcription/translation of spoken language to printed text using the Dragon Dictation System.

## 2023-03-24 ENCOUNTER — OFFICE VISIT (OUTPATIENT)
Dept: OBSTETRICS AND GYNECOLOGY | Facility: CLINIC | Age: 48
End: 2023-03-24
Payer: COMMERCIAL

## 2023-03-24 VITALS
BODY MASS INDEX: 22.35 KG/M2 | WEIGHT: 147 LBS | SYSTOLIC BLOOD PRESSURE: 118 MMHG | RESPIRATION RATE: 14 BRPM | DIASTOLIC BLOOD PRESSURE: 74 MMHG

## 2023-03-24 DIAGNOSIS — Z98.890 POST-OPERATIVE STATE: Primary | ICD-10-CM

## 2023-03-24 RX ORDER — HYDROCODONE BITARTRATE AND ACETAMINOPHEN 5; 325 MG/1; MG/1
1 TABLET ORAL EVERY 6 HOURS PRN
COMMUNITY
Start: 2023-03-15

## 2023-03-24 RX ORDER — AMOXICILLIN 500 MG/1
CAPSULE ORAL
COMMUNITY
Start: 2023-03-15

## 2023-08-10 NOTE — PROGRESS NOTES
"Subjective   Chief Complaint   Patient presents with    Gynecologic Exam     C/o vaginal pain due to heavy lifting, feels like bladder is falling out again.      Jeane Escalante is a 48 y.o. year old  who is S/P TVH with bilateral salpingectomy; anterior and posterior colpoperineorrhaphy; vaginal vault suspension with uterosacral ligaments 2023 presenting to be seen for her annual exam.  She is doing well, but reports feeling like her \"bladder is falling out again\" with heavy lifting. She had to get a second job, but due to the heavy lifting at work, she had to stop. She reports having difficulty finishing her stream of urine. She describes it as a heaviness within the vagina, and dribbles urine after standing up.     Mammogram: scheduled for 25   Colonoscopy: not yet completed, reports will be completed by age 52     SEXUAL Hx:  She is not currently sexually active.  In the past year there there has been NO new sexual partners.    She would like to be screened for STD's at today's exam, vaginal swab only.   HEALTH Hx:  She exercises regularly: yes. At work.   She wears her seat belt: yes.  She has concerns about domestic violence: no.    The following portions of the patient's history were reviewed and updated as appropriate:problem list, current medications, allergies, past family history, past medical history, past social history, and past surgical history.    Social History    Tobacco Use      Smoking status: Every Day        Packs/day: 1.00        Years: 20.00        Pack years: 20        Types: Cigarettes        Start date:       Smokeless tobacco: Never         Objective   /76 (BP Location: Right arm, Patient Position: Sitting, Cuff Size: Adult)   Ht 172.7 cm (68\")   Wt 66 kg (145 lb 9.6 oz)   LMP  (LMP Unknown)   Breastfeeding No   BMI 22.14 kg/mý     General:  well developed; well nourished  no acute distress   Skin:  No suspicious lesions seen   Breasts:  Examined in supine " position  Symmetric without masses or skin dimpling  Nipples normal without inversion, lesions or discharge  There are no palpable axillary nodes   Pelvis: Clinical staff was present for exam  External genitalia:  normal appearance of the external genitalia including Bartholin's and [place]'s glands.  :  urethral meatus normal;  Vaginal:  normal pink mucosa without prolapse or lesions.  Cervix:  absent.  Uterus:  absent.  Adnexa:  non palpable bilaterally.  Rectal:  digital rectal exam not performed; anus visually normal appearing.  Cystocele GRADE 1        Assessment   Normal postmenopausal GYN exam status post hysterectomy  STD screening   Anterior vaginal prolapse grade 1     Plan   Pap was not done today.  I explained to Jeane that the Pap smears are no longer recommended in patient's after hysterectomy.   I stressed to Jeane that she still should be seen to be seen yearly for a full physical including breast and pelvic exam.   She was encouraged to get yearly mammograms.  She should report any palpable breast lump(s) or skin changes regardless of mammographic findings.  I explained to Jeane that notification regarding her mammogram results will come from the center performing the study.  Our office will not be routinely calling with mammogram results.  It is her responsibility to make sure that the results from the mammogram are communicated to her by the breast center.  If she has any questions about the results, she is welcome to call our office anytime.  Leukorrhea swab collected for STD screening.  Will call patient with any abnormal results.  Discussed slight anterior prolapse present.  Discussed repeat evaluation by Dr. Ramirez if patient becomes bothered by symptoms in the future.  Patient voices understanding, and will call if needed in the future.  The importance of keeping all planned follow-up and taking all medications as prescribed was emphasized.  Follow up for annual exam in 1 year or sooner PRN      No orders of the defined types were placed in this encounter.         This note was electronically signed.    Esthela Jay MD   August 11, 2023    never used

## 2023-08-11 ENCOUNTER — OFFICE VISIT (OUTPATIENT)
Dept: OBSTETRICS AND GYNECOLOGY | Facility: CLINIC | Age: 48
End: 2023-08-11
Payer: COMMERCIAL

## 2023-08-11 VITALS
SYSTOLIC BLOOD PRESSURE: 110 MMHG | HEIGHT: 68 IN | BODY MASS INDEX: 22.07 KG/M2 | WEIGHT: 145.6 LBS | DIASTOLIC BLOOD PRESSURE: 76 MMHG

## 2023-08-11 DIAGNOSIS — N81.10 INCOMPLETE PROLAPSE OF ANTERIOR WALL OF VAGINA: ICD-10-CM

## 2023-08-11 DIAGNOSIS — Z11.3 SCREENING FOR STD (SEXUALLY TRANSMITTED DISEASE): ICD-10-CM

## 2023-08-11 DIAGNOSIS — Z01.419 WELL WOMAN EXAM: Primary | ICD-10-CM

## 2024-07-02 NOTE — PROGRESS NOTES
Subjective   Chief Complaint   Patient presents with    Follow-up     Patient is here to discuss bladder prolapse.  Patient wants to also discuss hormones.     Jeane Escalante is a 49 y.o. year old .  No LMP recorded (lmp unknown). Patient has had a hysterectomy.  She presents today for discussion on HRT possible return of bladder prolapse. She reports having issues with irritability and mood, along with lack of orgasm (which is a chronic, baseline problem for years). She is not on any medications for anxiety/depression. She was doing counseling, but then ran out of telehealth visits. She denies any vasomotor symptoms. She denies any issues with lubrication or foreplay. She also has to worry about passing gas when she is having intercourse. Her PCP wanted her to start a medication for anxiety/depression in the past, but she never started it. Denies SI/HI.     She also reports worsening bladder prolapse symptoms over the last few months. She reports she will think she is finished urinating, stand up and then have to urinate again. She denies any bulge symptoms or need for splinting. She also has to press on her bladder to make herself urinate and sometimes she will not make it in time and will urinate on herself. Denies any hematuria or dysuria.     The following portions of the patient's history were reviewed and updated as appropriate:current medications, allergies, past medical history, and past surgical history    Social History    Tobacco Use      Smoking status: Every Day        Packs/day: 1.00        Years: 1 pack/day for 33.5 years (33.5 ttl pk-yrs)        Types: Cigarettes        Start date:       Smokeless tobacco: Never         Objective   /80 (BP Location: Right arm, Patient Position: Sitting, Cuff Size: Adult)   Pulse 72   Wt 73.2 kg (161 lb 6.4 oz)   LMP  (LMP Unknown)   Breastfeeding No   BMI 24.54 kg/m²     General:  well developed; well nourished  no acute distress   Lungs:   breathing is unlabored   Heart:  Not performed.   Pelvis: Clinical staff was present for exam  External genitalia:  normal appearance of the external genitalia including Bartholin's and [place]'s glands.  :  urethral meatus normal;  Vaginal:  normal pink mucosa without prolapse or lesions.  Cervix:  absent.  Uterus:  absent.  Adnexa:  non palpable bilaterally.  Rectal:  digital rectal exam not performed; anus visually normal appearing.  Cystocele GRADE 0-1 on valsalva      Lab Review   No data reviewed    Imaging   No data reviewed        Assessment   Mixed urinary incontinence (overflow and urge)  Anxiety/depression  Lack of orgasm      Plan   Based on exam and minimal prolapse, do not think repeat surgical procedure is indicated at this time.  However based on patient's symptoms and weak pelvic floor, would recommend trial of pelvic floor physical therapy.  If therapy does not improve her symptoms, would recommend referral to either urology or urologic gynecology for further evaluation.    Extensively discussed HRT and indications for treatment based on vasomotor symptoms, which patient does not have at this time.  Due to emotional lability, mood swings, previous diagnosis of anxiety depression, would recommend restarting counseling along with possible medication. Patient voiced understanding.   Discussed increased lubrication and foreplay and clitoral stimulation for inability to orgasm.  Also discussed anxiety and depression symptoms can also decrease libido and sexual satisfaction.  The importance of keeping all planned follow-up and taking all medications as prescribed was emphasized.  Follow up for annual exam and follow up in symptoms after PFPT in 3-4 months or sooner PRN     No orders of the defined types were placed in this encounter.         This note was electronically signed.    Esthela Jay MD  July 3, 2024

## 2024-07-03 ENCOUNTER — OFFICE VISIT (OUTPATIENT)
Dept: OBSTETRICS AND GYNECOLOGY | Facility: CLINIC | Age: 49
End: 2024-07-03
Payer: COMMERCIAL

## 2024-07-03 VITALS
DIASTOLIC BLOOD PRESSURE: 80 MMHG | HEART RATE: 72 BPM | WEIGHT: 161.4 LBS | BODY MASS INDEX: 24.54 KG/M2 | SYSTOLIC BLOOD PRESSURE: 116 MMHG

## 2024-07-03 DIAGNOSIS — N39.490 OVERFLOW INCONTINENCE: Primary | ICD-10-CM

## 2024-07-03 DIAGNOSIS — N39.46 URINARY INCONTINENCE, MIXED: ICD-10-CM

## 2024-07-03 DIAGNOSIS — F32.A ANXIETY AND DEPRESSION: ICD-10-CM

## 2024-07-03 DIAGNOSIS — F41.9 ANXIETY AND DEPRESSION: ICD-10-CM

## (undated) DEVICE — SCRB SURG BACTOSHIELD CHG 4PCT 4OZ

## (undated) DEVICE — SUT VIC 1 SUTUPAK TIES 18IN J913G

## (undated) DEVICE — TUBING, SUCTION, 1/4" X 10', STRAIGHT: Brand: MEDLINE

## (undated) DEVICE — GLV SURG SIGNATURE ESSENTIAL PF LTX SZ7.5

## (undated) DEVICE — PACKING,VAGINAL,XR,ST,4"X36",100EA: Brand: MEDLINE

## (undated) DEVICE — ENSEAL 20 CM SHAFT, LARGE JAW: Brand: ENSEAL X1

## (undated) DEVICE — SUT VIC MO 0/0 18IN D6043

## (undated) DEVICE — GLV SURG BIOGEL LTX PF 6

## (undated) DEVICE — GAUZE,SPONGE,4"X4",16PLY,XRAY,STRL,LF: Brand: MEDLINE

## (undated) DEVICE — SPONGE,LAP,4"X18",XR,ST,5/PK,40PK/CS: Brand: MEDLINE INDUSTRIES, INC.

## (undated) DEVICE — STRAP STIRUP WO/RNG 19X3.5IN DISP

## (undated) DEVICE — ANTIBACTERIAL UNDYED BRAIDED (POLYGLACTIN 910), SYNTHETIC ABSORBABLE SUTURE: Brand: COATED VICRYL

## (undated) DEVICE — SUT PDS O CT1 CR/8 18IN Z740D

## (undated) DEVICE — TBG PENCL TELESCP MEGADYNE SMOKE EVAC 10FT

## (undated) DEVICE — LEX VAGINAL HYSTERECTOMY: Brand: MEDLINE INDUSTRIES, INC.

## (undated) DEVICE — GLV SURG SIGNATURE TOUCH PF LTX 7.5 STRL BX/50

## (undated) DEVICE — DRAPE,TOP,102X53,STERILE: Brand: MEDLINE

## (undated) DEVICE — SUT VICYL PLS CTD ANTIB BR 1 27IN VIL

## (undated) DEVICE — IRRIGATOR BULB ASEPTO 60CC STRL

## (undated) DEVICE — INTENDED FOR TISSUE SEPARATION, AND OTHER PROCEDURES THAT REQUIRE A SHARP SURGICAL BLADE TO PUNCTURE OR CUT.: Brand: BARD-PARKER ® STAINLESS STEEL BLADES

## (undated) DEVICE — MEDI-VAC YANKAUER SUCTION HANDLE W/BULBOUS TIP: Brand: CARDINAL HEALTH